# Patient Record
Sex: MALE | Race: WHITE | Employment: OTHER | URBAN - METROPOLITAN AREA
[De-identification: names, ages, dates, MRNs, and addresses within clinical notes are randomized per-mention and may not be internally consistent; named-entity substitution may affect disease eponyms.]

---

## 2020-10-08 PROBLEM — H26.492 LEFT POSTERIOR CAPSULAR OPACIFICATION: Status: ACTIVE | Noted: 2018-03-01

## 2020-10-08 PROBLEM — H43.813 POSTERIOR VITREOUS DETACHMENT OF BOTH EYES: Status: ACTIVE | Noted: 2018-03-01

## 2020-10-08 PROBLEM — R05.9 COUGH: Status: ACTIVE | Noted: 2020-10-08

## 2020-10-08 PROBLEM — J45.20 MILD INTERMITTENT ASTHMA: Status: ACTIVE | Noted: 2017-08-22

## 2020-10-08 PROBLEM — G47.33 OBSTRUCTIVE SLEEP APNEA: Status: ACTIVE | Noted: 2017-05-12

## 2020-10-08 PROBLEM — I71.40 ABDOMINAL AORTIC ANEURYSM (AAA) WITHOUT RUPTURE: Status: ACTIVE | Noted: 2020-09-29

## 2020-10-08 PROBLEM — I67.9 CEREBROVASCULAR DISEASE, UNSPECIFIED: Status: ACTIVE | Noted: 2020-10-08

## 2020-10-08 PROBLEM — S72.009A HIP FRACTURE (HCC): Status: ACTIVE | Noted: 2020-05-19

## 2020-10-08 PROBLEM — Z96.1 PSEUDOPHAKIA: Status: ACTIVE | Noted: 2019-03-05

## 2020-10-08 PROBLEM — Z95.3 S/P AORTIC VALVE REPLACEMENT WITH BIOPROSTHETIC VALVE: Status: ACTIVE | Noted: 2018-05-29

## 2020-10-08 PROBLEM — N20.0 KIDNEY STONE: Status: ACTIVE | Noted: 2020-10-08

## 2020-10-08 PROBLEM — I48.20 CHRONIC ATRIAL FIBRILLATION (HCC): Status: ACTIVE | Noted: 2017-05-12

## 2020-10-08 PROBLEM — I50.32 CHRONIC HEART FAILURE WITH PRESERVED EJECTION FRACTION (HCC): Status: ACTIVE | Noted: 2020-03-04

## 2020-10-08 PROBLEM — S80.12XA HEMATOMA OF LEFT LOWER EXTREMITY: Status: ACTIVE | Noted: 2019-05-20

## 2020-10-08 PROBLEM — I10 ESSENTIAL HYPERTENSION: Status: ACTIVE | Noted: 2020-10-08

## 2020-10-08 PROBLEM — Z85.828 HISTORY OF BASAL CELL CARCINOMA OF SKIN: Status: ACTIVE | Noted: 2017-10-22

## 2020-10-08 PROBLEM — I27.20 MILD PULMONARY HYPERTENSION (HCC): Status: ACTIVE | Noted: 2018-12-04

## 2020-10-08 PROBLEM — G45.4 TGA (TRANSIENT GLOBAL AMNESIA): Status: ACTIVE | Noted: 2020-05-27

## 2022-03-18 PROBLEM — S72.009A HIP FRACTURE (HCC): Status: ACTIVE | Noted: 2020-05-19

## 2022-03-18 PROBLEM — I10 ESSENTIAL HYPERTENSION: Status: ACTIVE | Noted: 2020-10-08

## 2022-03-18 PROBLEM — G47.33 OBSTRUCTIVE SLEEP APNEA: Status: ACTIVE | Noted: 2017-05-12

## 2022-03-19 PROBLEM — N20.0 KIDNEY STONE: Status: ACTIVE | Noted: 2020-10-08

## 2022-03-19 PROBLEM — Z95.3 S/P AORTIC VALVE REPLACEMENT WITH BIOPROSTHETIC VALVE: Status: ACTIVE | Noted: 2018-05-29

## 2022-03-19 PROBLEM — Z85.828 HISTORY OF BASAL CELL CARCINOMA OF SKIN: Status: ACTIVE | Noted: 2017-10-22

## 2022-03-19 PROBLEM — G45.4 TGA (TRANSIENT GLOBAL AMNESIA): Status: ACTIVE | Noted: 2020-05-27

## 2022-03-19 PROBLEM — S80.12XA HEMATOMA OF LEFT LOWER EXTREMITY: Status: ACTIVE | Noted: 2019-05-20

## 2022-03-19 PROBLEM — Z96.1 PSEUDOPHAKIA: Status: ACTIVE | Noted: 2019-03-05

## 2022-03-19 PROBLEM — I71.40 ABDOMINAL AORTIC ANEURYSM (AAA) WITHOUT RUPTURE (HCC): Status: ACTIVE | Noted: 2020-09-29

## 2022-03-19 PROBLEM — H26.492 LEFT POSTERIOR CAPSULAR OPACIFICATION: Status: ACTIVE | Noted: 2018-03-01

## 2022-03-19 PROBLEM — R05.9 COUGH: Status: ACTIVE | Noted: 2020-10-08

## 2022-03-19 PROBLEM — I50.32 CHRONIC HEART FAILURE WITH PRESERVED EJECTION FRACTION (HCC): Status: ACTIVE | Noted: 2020-03-04

## 2022-03-19 PROBLEM — J45.20 MILD INTERMITTENT ASTHMA: Status: ACTIVE | Noted: 2017-08-22

## 2022-03-20 PROBLEM — I27.20 MILD PULMONARY HYPERTENSION (HCC): Status: ACTIVE | Noted: 2018-12-04

## 2022-03-20 PROBLEM — I48.20 CHRONIC ATRIAL FIBRILLATION (HCC): Status: ACTIVE | Noted: 2017-05-12

## 2022-03-20 PROBLEM — I67.9 CEREBROVASCULAR DISEASE, UNSPECIFIED: Status: ACTIVE | Noted: 2020-10-08

## 2022-03-20 PROBLEM — H43.813 POSTERIOR VITREOUS DETACHMENT OF BOTH EYES: Status: ACTIVE | Noted: 2018-03-01

## 2022-10-24 ENCOUNTER — OFFICE VISIT (OUTPATIENT)
Dept: ORTHOPEDIC SURGERY | Age: 77
End: 2022-10-24
Payer: MEDICARE

## 2022-10-24 VITALS — BODY MASS INDEX: 34.54 KG/M2 | WEIGHT: 255 LBS | HEIGHT: 72 IN

## 2022-10-24 DIAGNOSIS — M16.12 PRIMARY OSTEOARTHRITIS OF LEFT HIP: Primary | ICD-10-CM

## 2022-10-24 DIAGNOSIS — Z96.641 STATUS POST RIGHT HIP REPLACEMENT: ICD-10-CM

## 2022-10-24 PROCEDURE — 1101F PT FALLS ASSESS-DOCD LE1/YR: CPT | Performed by: ORTHOPAEDIC SURGERY

## 2022-10-24 PROCEDURE — G8756 NO BP MEASURE DOC: HCPCS | Performed by: ORTHOPAEDIC SURGERY

## 2022-10-24 PROCEDURE — 99204 OFFICE O/P NEW MOD 45 MIN: CPT | Performed by: ORTHOPAEDIC SURGERY

## 2022-10-24 PROCEDURE — G8536 NO DOC ELDER MAL SCRN: HCPCS | Performed by: ORTHOPAEDIC SURGERY

## 2022-10-24 PROCEDURE — 1123F ACP DISCUSS/DSCN MKR DOCD: CPT | Performed by: ORTHOPAEDIC SURGERY

## 2022-10-24 PROCEDURE — G8427 DOCREV CUR MEDS BY ELIG CLIN: HCPCS | Performed by: ORTHOPAEDIC SURGERY

## 2022-10-24 PROCEDURE — G8417 CALC BMI ABV UP PARAM F/U: HCPCS | Performed by: ORTHOPAEDIC SURGERY

## 2022-10-24 PROCEDURE — G8432 DEP SCR NOT DOC, RNG: HCPCS | Performed by: ORTHOPAEDIC SURGERY

## 2022-10-24 RX ORDER — ESCITALOPRAM OXALATE 5 MG/1
5 TABLET ORAL DAILY
COMMUNITY
Start: 2022-09-19

## 2022-10-24 RX ORDER — TORSEMIDE 100 MG/1
100 TABLET ORAL DAILY
COMMUNITY
Start: 2022-10-06

## 2022-10-24 RX ORDER — CYANOCOBALAMIN 1000 UG/ML
1000 INJECTION, SOLUTION INTRAMUSCULAR; SUBCUTANEOUS
COMMUNITY
Start: 2022-06-15

## 2022-10-24 RX ORDER — DONEPEZIL HYDROCHLORIDE 5 MG/1
5 TABLET, FILM COATED ORAL DAILY
COMMUNITY
Start: 2022-06-15

## 2022-10-24 NOTE — PROGRESS NOTES
Jannette Foss (: 1945) is a 65 y.o. male, patient, here for evaluation of the following chief complaint(s):  Hip Pain (Left /)       SUBJECTIVE/OBJECTIVE:  Jannette Foss presents today complaining of left hip pain. Progressive symptoms over the last 6 to 12 months. Pain is diffuse, worst anterior and in the groin. Walking on flat ground for long distances is extremely limited. Significant pain at start up. Difficulty doing socks and shoes, navigating stairs, getting in and out of a car. He takes Tylenol for pain which does not help. He uses a cane most of the time. He is very unhappy with progressive pain and dysfunction, declining quality of life. History of right total hip arthroplasty twice, once in  and again in , treated in [place]. Denies subjective leg length discrepancy. On Eliquis for A. fib. He also sees a cardiologist for history of repaired aortic aneurysm. Has a neurologist for early dementia type symptoms that seemed to be exacerbated after his most recent hip surgery. Symptoms after that surgery are what led to the new diagnosis. PHYSICAL EXAM:  Vitals: Ht 6' (1.829 m)   Wt 255 lb (115.7 kg)   BMI 34.58 kg/m²   Body mass index is 34.58 kg/m². 65y.o. year old M in no acute distress. Ambulates with mild Trendelenburg gait on the left side. Lumbar spine is stiff, no pain with flexion extension. No nerve tension signs. Pain-free motion right total hip. Positive Stinchfield on the left. Mild hip flexion contracture, cannot extend to neutral while lying supine without significant pain. Flexion limited to 90, internal rotation -5, external rotation only about 30 with pain. Symmetrical palpable distal pulses. No gross motor or sensory deficits in lower extremities. No distal edema. Apparent leg length discrepancy with the left approximately 1.5 cm short.     IMAGING:  Radiographs: 3 x-ray views of the right hip performed at an outside facility recently were reviewed and interpreted by me today. Severe osteoarthritis of the left hip with complete loss of proximal joint space. Underlying cam deformity. Peripheral osteophyte formation present. Cementless total hip components in the right hip, apparently well fixed. Cerclage cable present around the proximal femur. XR Results (most recent):  Results from Appointment encounter on 10/24/22    XR PELV 1 OR 2 V    Narrative  Single weightbearing AP pelvis x-ray today for preoperative planning purposes demonstrates complete loss of proximal hip joint space on the left hip. Underlying cam deformity. Peripheral osteophyte formation on both sides of the joint. Severe lumbar degenerative disc disease present. Cementless right total hip components in satisfactory position and alignment, no evidence of loosening. ASSESSMENT/PLAN:  1. Primary osteoarthritis of left hip  2. Status post right hip replacement    We discussed continued conservative treatment measures vs left total hip replacement. Symptoms have progressed despite conservative treatment measures outlined above and he desires to proceed with left total hip replacement. Discussed risks, benefits, and alternatives in detail, as well as anticipated hospital stay and course of rehabilitation. The patient and his wife both understand high risk for acute delirium and overall worsening of cognitive dysfunction after surgery. We will do everything we can to perform surgery under spinal anesthesia. He will see primary care physician prior to surgery, as well as his cardiologist.   All questions answered. Plan is to use topical TXA and half dose eliquis x5 days postop before resuming full preop dose. In order to utilize spinal anesthesia, he will need to be off of Eliquis for 72 hours prior to surgery. Plan to use direct anterior approach. No follow-ups on file.           Review Of Systems  ROS    Positive for: Musculoskeletal  Last edited by Brenda Bales on 10/24/2022  1:42 PM.         Patient denies any recent fever, chills, nausea, vomiting, chest pain, or shortness of breath. No Known Allergies    Current Outpatient Medications   Medication Sig    torsemide (DEMADEX) 100 mg tablet Take 100 mg by mouth daily. cyanocobalamin (VITAMIN B12) 1,000 mcg/mL injection 1,000 mcg by IntraMUSCular route. donepeziL (ARICEPT) 5 mg tablet Take 5 mg by mouth daily. escitalopram oxalate (LEXAPRO) 5 mg tablet Take 5 mg by mouth daily. apixaban (ELIQUIS) 5 mg tablet Take 5 mg by mouth two (2) times a day. cetirizine (ZYRTEC) 10 mg tablet Take 10 mg by mouth daily. cholecalciferol (VITAMIN D3) (1000 Units /25 mcg) tablet Take 1,000 Units by mouth daily. hydrocortisone (HYTONE) 2.5 % ointment     metoprolol succinate (TOPROL-XL) 50 mg XL tablet Take 50 mg by mouth daily. simvastatin (ZOCOR) 40 mg tablet Take 40 mg by mouth nightly. acetaminophen (TYLENOL) 500 mg tablet Take 500 mg by mouth every four (4) hours as needed. (Patient not taking: Reported on 10/24/2022)    furosemide (LASIX) 40 mg tablet Take 40 mg by mouth daily. No current facility-administered medications for this visit.        Past Medical History:   Diagnosis Date    Abdominal aortic aneurysm (AAA) without rupture (HCC)     Arrhythmia     Arthropathy     Basal cell carcinoma     lower back    Cerebrovascular disease     Chronic heart failure with preserved ejection fraction (HCC)     Dyslipidemia     Erectile dysfunction     Essential hypertension     H/O maze procedure     Hypertension     Mild intermittent asthma     Osteoarthritis of right hip     S/P AVR (aortic valve replacement) and aortoplasty         Past Surgical History:   Procedure Laterality Date    HX COLONOSCOPY      HX HIP REPLACEMENT Right     4/2014 and 5/20/20    HX TONSIL AND ADENOIDECTOMY         Family History   Problem Relation Age of Onset    Kidney Disease Father Diabetes Sister     Diabetes Brother     Heart Failure Other         Social History     Socioeconomic History    Marital status:      Spouse name: Not on file    Number of children: Not on file    Years of education: Not on file    Highest education level: Not on file   Occupational History    Not on file   Tobacco Use    Smoking status: Never    Smokeless tobacco: Never   Vaping Use    Vaping Use: Never used   Substance and Sexual Activity    Alcohol use: Not on file    Drug use: Never    Sexual activity: Not on file   Other Topics Concern    Not on file   Social History Narrative    Not on file     Social Determinants of Health     Financial Resource Strain: Not on file   Food Insecurity: Not on file   Transportation Needs: Not on file   Physical Activity: Not on file   Stress: Not on file   Social Connections: Not on file   Intimate Partner Violence: Not on file   Housing Stability: Not on file       No orders of the defined types were placed in this encounter. Raf Cardona. Zari Ansari M.D. An electronic signature was used to authenticate this note.

## 2022-10-25 DIAGNOSIS — M16.12 PRIMARY OSTEOARTHRITIS OF LEFT HIP: Primary | ICD-10-CM

## 2022-12-27 ENCOUNTER — HOSPITAL ENCOUNTER (OUTPATIENT)
Dept: PREADMISSION TESTING | Age: 77
Discharge: HOME | End: 2022-12-27
Payer: MEDICARE

## 2022-12-27 VITALS
HEIGHT: 71 IN | HEART RATE: 60 BPM | BODY MASS INDEX: 35.34 KG/M2 | DIASTOLIC BLOOD PRESSURE: 75 MMHG | WEIGHT: 252.43 LBS | TEMPERATURE: 97.4 F | SYSTOLIC BLOOD PRESSURE: 135 MMHG

## 2022-12-27 LAB
ABO + RH BLD: NORMAL
APPEARANCE UR: CLEAR
BACTERIA URNS QL MICRO: NEGATIVE /HPF
BILIRUB UR QL: NEGATIVE
BLOOD GROUP ANTIBODIES SERPL: NORMAL
COLOR UR: ABNORMAL
EPITH CASTS URNS QL MICRO: ABNORMAL /LPF
EST. AVERAGE GLUCOSE BLD GHB EST-MCNC: 114 MG/DL
GLUCOSE UR STRIP.AUTO-MCNC: NEGATIVE MG/DL
HBA1C MFR BLD: 5.6 % (ref 4–5.6)
HGB UR QL STRIP: NEGATIVE
HYALINE CASTS URNS QL MICRO: ABNORMAL /LPF (ref 0–5)
INR PPP: 1.1 (ref 0.9–1.1)
KETONES UR QL STRIP.AUTO: NEGATIVE MG/DL
LEUKOCYTE ESTERASE UR QL STRIP.AUTO: NEGATIVE
NITRITE UR QL STRIP.AUTO: NEGATIVE
PH UR STRIP: 6.5 [PH] (ref 5–8)
PROT UR STRIP-MCNC: 100 MG/DL
PROTHROMBIN TIME: 11.1 SEC (ref 9–11.1)
RBC #/AREA URNS HPF: ABNORMAL /HPF (ref 0–5)
SP GR UR REFRACTOMETRY: 1.02 (ref 1–1.03)
SPECIMEN EXP DATE BLD: NORMAL
UA: UC IF INDICATED,UAUC: ABNORMAL
UROBILINOGEN UR QL STRIP.AUTO: 0.2 EU/DL (ref 0.2–1)
WBC URNS QL MICRO: ABNORMAL /HPF (ref 0–4)

## 2022-12-27 PROCEDURE — 36415 COLL VENOUS BLD VENIPUNCTURE: CPT

## 2022-12-27 PROCEDURE — 81001 URINALYSIS AUTO W/SCOPE: CPT

## 2022-12-27 PROCEDURE — 86900 BLOOD TYPING SEROLOGIC ABO: CPT

## 2022-12-27 PROCEDURE — 85610 PROTHROMBIN TIME: CPT

## 2022-12-27 PROCEDURE — 83036 HEMOGLOBIN GLYCOSYLATED A1C: CPT

## 2022-12-27 NOTE — PERIOP NOTES
[address]  ORTHOPAEDIC    Surgery Date:   1/3/23    Your surgeon's office or [facility] staff will call you between 4 PM- 8 PM the day before surgery with your arrival time. If your surgery is on a Monday, you will receive a call the preceding Friday. Please report to [facility] Patient Access/Admitting on the 1st floor. Bring your insurance card, photo identification, and any copayment (if applicable). If you are going home the same day of your surgery, you must have a responsible adult to drive you home. You need to have a responsible adult to stay with you the first 24 hours after surgery and you should not drive a car for 24 hours following your surgery. Do NOT eat any solid foods after midnight the night before surgery including candy, mints or gum. You may drink clear liquids from midnight until 1 hour prior to arrival time. You may drink up to 12 ounces at one time every 4 hours. Do NOT drink alcohol or smoke 24 hours before surgery. STOP smoking for 14 days prior as it helps with breathing and healing after surgery. If your arrival time is 3pm or later, you may eat a light breakfast before 8am (toast, bagel-no butter, black coffee, plain tea, fruit juice-no pulp) Please note special instructions, if applicable, below for medications. If you are being admitted to the hospital,please leave personal belongings/luggage in your car until you have an assigned hospital room number. Please wear comfortable clothes. Wear your glasses instead of contacts. We ask that all money, jewelry and valuables be left at home. Wear no make up, particularly mascara, the day of surgery. All body piercings, rings, and jewelry need to be removed and left at home. Please remove any nail polish or artificial nails from your fingernails. Please wear your hair loose or down. Please no pony-tails, buns, or any metal hair accessories.  If you shower the morning of surgery, please do not apply any lotions or powders afterwards. You may wear deodorant. Do not shave any body area within 24 hours of your surgery. Please follow all instructions to avoid any potential surgical cancellation. Should your physical condition change, (i.e. fever, cold, flu, etc.) please notify your surgeon as soon as possible. It is important to be on time. If a situation occurs where you may be delayed, please call:  [phone] / [phone] on the day of surgery. The Preadmission Testing staff can be reached at [phone]. Special instructions: [address] [place] [address] DAY OF SURGERY. Current Outpatient Medications   Medication Sig    torsemide (DEMADEX) 100 mg tablet Take 100 mg by mouth daily. cyanocobalamin (VITAMIN B12) 1,000 mcg/mL injection 1,000 mcg by IntraMUSCular route every month. HAS ALREADY HAD IN DEC. 2022    donepeziL (ARICEPT) 5 mg tablet Take 5 mg by mouth nightly. escitalopram oxalate (LEXAPRO) 5 mg tablet Take 5 mg by mouth daily. apixaban (ELIQUIS) 5 mg tablet Take 5 mg by mouth two (2) times a day. cetirizine (ZYRTEC) 10 mg tablet Take 10 mg by mouth nightly. cholecalciferol (VITAMIN D3) (1000 Units /25 mcg) tablet Take 2,000 Units by mouth daily. metoprolol succinate (TOPROL-XL) 50 mg XL tablet Take 50 mg by mouth daily. simvastatin (ZOCOR) 40 mg tablet Take 40 mg by mouth nightly. No current facility-administered medications for this encounter. YOU MUST ONLY TAKE THESE MEDICATIONS THE MORNING OF SURGERY WITH A SIP OF WATER: METOPROLOL, LEXAPRO, TORSEMIDE  MEDICATIONS TO TAKE THE MORNING OF SURGERY ONLY IF NEEDED: NONE  HOLD these prescription medications BEFORE Surgery: NONE  Ask your surgeon/prescribing physician about when/if to STOP taking these medications: ELIQUIS  Stop any non-steroidal anti-inflammatory drugs (i.e. Ibuprofen, Naproxen, Advil, Aleve) 3 days before surgery. You may take Tylenol.   STOP all vitamins and herbal supplements 1 week prior to  surgery. If you are currently taking Plavix, Coumadin, or any other blood-thinning/anticoagulant medication contact your prescribing physician for instructions. Preventing Infections Before and After - Your Surgery    IMPORTANT INSTRUCTIONS    You play an important role in your health and preparation for surgery. To reduce the germs on your skin you will need to shower with CHG soap (Chorhexidine gluconate 4%) two times before surgery. CHG soap (Hibiclens, Hex-A-Clens or store brand)  CHG soap will be provided at your Preadmission Testing (PAT) appointment. If you do not have a PAT appointment before surgery, you may arrange to  CHG soap from our office or purchase CHG soap at a pharmacy, grocery or department store. You need to purchase TWO 4 ounce bottles to use for your 2 showers. Steps to follow:  Bryn Bowels your hair with your normal shampoo and your body with regular soap and rinse well to remove shampoo and soap from your skin. Wet a clean washcloth and turn off the shower. Put CHG soap on washcloth and apply to your entire body from the neck down. Do not use on your head, face or private parts(genitals). Do not use CHG soap on open sores, wounds or areas of skin irritation. Wash you body gently for 5 minutes. Do not wash your skin too hard. This soap does not create lather. Pay special attention to your underarms and from your belly button to your feet. Turn the shower back on and rinse well to get CHG soap off your body. Pat your skin dry with a clean, dry towel. Do not apply lotions or moisturizer. Put on clean clothes and sleep on fresh bed sheets and do not allow pets to sleep with you.     Shower with CHG soap 2 times before your surgery  The evening before your surgery  The morning of your surgery      Tips to help prevent infections after your surgery:  Protect your surgical wound from germs:  Hand washing is the most important thing you and your caregivers can do to prevent infections. Keep your bandage clean and dry! Do not touch your surgical wound. Use clean, freshly washed towels and washcloths every time you shower; do not share bath linens with others. Until your surgical wound is healed, wear clothing and sleep on bed linens each day that are clean and freshly washed. Do not allow pets to sleep in your bed with you or touch your surgical wound. Do not smoke - smoking delays wound healing. This may be a good time to stop smoking. If you have diabetes, it is important for you to manage your blood sugar levels properly before your surgery as well as after your surgery. Poorly managed blood sugar levels slow down wound healing and prevent you from healing completely. Prevention of Infection  Testing for Staphylococcus aureus on your skin before surgery    Staphylococcus aureus (staph) is a common bacteria that is found on the body. It normally does not cause infection on healthy skin. Before surgery, you will be tested to see if you have staph by swabbing the inside of your nose. When you have an incision with surgery, the goal is to protect that incision from infection. Removal of the staph bacteria before surgery can decrease the risk of a surgical site infection. If your nose swab is positive for staph you will be called. Your treatment will include 2 steps:  Prescription for Mupirocin ointment to be used in each nostril twice a day for 5 days. Showering with Chlorhexidine (CHG) liquid soap for 5 days prior to surgery. How to use Mupirocin ointment in your nose   the prescription from your pharmacy. You will receive a large tube of ointment which will be big enough for all of your treatments. You will apply this ointment to each nostril 2 times a day for 5 days. Wash your hands with  gel or soap and water for 20 seconds before using ointment. Place a pea-sized amount of ointment on a cotton Q-tip.   Apply ointment just inside of each nostril with the Q-tip. Do not push Q-tip or ointment deep inside you nose. Press your nostrils together and massage for a few seconds. Wash your hands with  gel or soap and water after you are finished. Do not get ointment near your eyes. If it gets into your eyes, rinse them with cool water. If you need to use nasal spray, clean the tip of the bottle with alcohol before use and do not use both at the same time. If you are scheduled for COVID testing during the 5 days, do NOT apply morning dose until after the COVID test has been performed. How to use Chlorhexidine (CHG) 4% liquid soap  Purchase an 8 ounce bottle of CHG liquid soap (Chlorhexidine 4%, Hibiclens, Hex-A-Clens or store brand) at a pharmacy or grocery store. Wash your hair with your normal shampoo and your body with regular soap and rinse well to remove shampoo and soap from your skin. Wet a clean washcloth and turn off the shower. Put CHG soap on washcloth and apply to your entire body from the neck down. Do not use on your head, face or private parts(genitals). Do not use CHG soap on open sores, wounds or areas of skin irritation. Wash your body gently for 5 minutes. Do not wash your skin too hard. This soap does not create lather. Pay special attention to your underarms and from your belly button to your feet. Turn the shower back on and rinse well to get CHG soap off your body. Pat your skin dry with a clean, dry towel. Do not apply lotions or moisturizer. Put on clean clothes and sleep on fresh bed sheets the night before surgery. Do not allow pets to sleep with you. Eating and Drinking Before Surgery    You may eat a regular dinner at the usual time on the day before your surgery. Do NOT eat any solid foods after midnight unless your arrival time at the hospital is 3pm or later.   You may drink clear liquids only from 12 midnight until 1 hours prior to your arrival time at the hospital on the day of your surgery. Do NOT drink alcohol. Clear liquids include:  Water  Fruit juices without pulp( i.e. apple juice)  Carbonated beverages  Black coffee (no cream/milk)  Tea (no cream/milk)  Gatorade  You may drink up to 12-16 ounces at one time every 4 hours between the hours of midnight and 1 hour before your arrival time at the hospital. Example- if your arrival time at the hospital is 6am, you may drink 12-16 ounces of clear liquids no later than 5am.  If your arrival time at the hospital is 3pm or later, you may eat a light breakfast before 8am.  A light breakfast includes: Toast or bagel (no butter)  Black coffee (no cream/milk)  Tea (no cream/milk)  Fruit juices without pulp ( i.e. apple juice)  Do NOT eat meat, eggs, vegetables or fruit  If you have any questions, please contact your surgeon's office. Patient Information Regarding COVID Restrictions    Day of Procedure    Please park in the parking deck or any designated visitor parking lot. Enter the facility through the Main Entrance of the hospital.  On the day of surgery, please provide the cell phone number of the person who will be waiting for you to the Patient Access representative at the time of registration. Masks are highly recommended in the hospital, but not required. Once your procedure and the immediate recovery period is completed, a nurse in the recovery area will contact your designated visitor to inform them of your room number or to otherwise review other pertinent information regarding your care. Social distancing practices are strongly encouraged in waiting areas and the cafeteria. The patient  AND WIFE was contacted in person. They verbalized understanding of all instructions does not  need reinforcement.

## 2022-12-27 NOTE — PERIOP NOTES
COPY OF COVID CARD PLACED ON CHART. CALL TO CARDIOLOGY/DR. TAMMI HURST/[facility]/[place], [place]./[phone] AND REQ MOST RECENT NOTES TO BE FAXED. PT'S WIFE BROUGHT IN EKG DONE AT PCP OFFICE ON 12/7/22 THAT WAS READ BY PT'S CARDIOLOGIST. THIS WAS PLACED ON CHART AFTER MAKING PT A COPY. CBC AND BMP FROM PCP DONE 12/7/22 PLACED ON CHART. A1C, PT, [facility], T&S AND MRSA DONE AT [facility] APPT. PT ON ELIQUIS 5MG PO BID. CARDIOLOGY INSTRUCTED PT TO STOP ELIQUIS ON 12/30/22 (3 DAYS PRIOR TO SURGERY).

## 2022-12-28 LAB
BACTERIA SPEC CULT: NORMAL
BACTERIA SPEC CULT: NORMAL
SERVICE CMNT-IMP: NORMAL

## 2022-12-28 NOTE — PERIOP NOTES
PAT Nurse Practitioner   Pre-Operative Chart Review/Assessment:-ORTHOPEDIC                Patient Name:  Ramona Lund                                                           Age:   65 y.o.    :  1945     Today's Date:  2022     Date of PAT:   22      Date of Surgery:    1/3/23      Procedure(s):  Left Total Hip Arthroplasty     Surgeon:   Dr. Emily Soto:      1)  Medical Clearance/PCP:  Girish Castellanos MD      2)  Cardiac Clearance:  Pt followed by Dr. Samson Kumar NP([facility]). LOV 22. Clearance obtained. Notes and testing in CE. 3)  Diabetic Treatment Consult:  Not indicated. A1c-5.6      4)  Sleep Apnea evaluation:   +JULES dx. Pt uses CPAP.        5) Treatment for MRSA/Staph Aureus:  Neg      6) Additional Concerns:  HTN, GERD, A-fib s/p MAZE, HFpEF(60%), AS s/p AVR, hx of CVA, [place]              Vital Signs:         Vitals:    22 1121   BP: 135/75   Pulse: 60   Temp: 97.4 °F (36.3 °C)   Weight: 114.5 kg (252 lb 6.8 oz)   Height: 5' 11\" (1.803 m)          Body mass index is 35.21 kg/m².         ____________________________________________  PAST MEDICAL HISTORY  Past Medical History:   Diagnosis Date    Abdominal aortic aneurysm (AAA) without rupture     Adverse effect of anesthesia     confusion and memory loss after anesthesia    Arrhythmia     AFIB    Arthropathy     Basal cell carcinoma     LOW BACK, HEAD, NECK    Cerebrovascular disease     Chronic heart failure with preserved ejection fraction (HCC)     Dyslipidemia     Erectile dysfunction     Essential hypertension     GERD (gastroesophageal reflux disease)     H/O maze procedure     Heart failure ([address]     CHF    Hypertension     Mild intermittent asthma     Osteoarthritis of right hip     Psychiatric disorder     DEPRESSION    S/P AVR (aortic valve replacement) and aortoplasty     Sleep apnea     CPAP USE    Stroke ([address]     DURING SURGERY FOR AORTIC VALVE ____________________________________________  PAST SURGICAL HISTORY  Past Surgical History:   Procedure Laterality Date    HX COLONOSCOPY      HX HEART VALVE SURGERY  2011    AORTIC VALVE    HX HEENT Bilateral     CATARACTS    HX HIP REPLACEMENT Right     4/2014 and 5/20/20    HX OTHER SURGICAL  2022    Alzheimer's    HX TONSIL AND ADENOIDECTOMY      OK CARDIAC SURG PROCEDURE UNLIST  2011    VASCULAR SURGERY PROCEDURE UNLIST  2021    peripheral left leg      ____________________________________________  HOME MEDICATIONS  Current Outpatient Medications   Medication Sig    torsemide (DEMADEX) 100 mg tablet Take 100 mg by mouth daily. cyanocobalamin (VITAMIN B12) 1,000 mcg/mL injection 1,000 mcg by IntraMUSCular route every month. HAS ALREADY HAD IN DEC. 2022    donepeziL (ARICEPT) 5 mg tablet Take 5 mg by mouth nightly. escitalopram oxalate (LEXAPRO) 5 mg tablet Take 5 mg by mouth daily. apixaban (ELIQUIS) 5 mg tablet Take 5 mg by mouth two (2) times a day. cetirizine (ZYRTEC) 10 mg tablet Take 10 mg by mouth nightly. cholecalciferol (VITAMIN D3) (1000 Units /25 mcg) tablet Take 2,000 Units by mouth daily. metoprolol succinate (TOPROL-XL) 50 mg XL tablet Take 50 mg by mouth daily. simvastatin (ZOCOR) 40 mg tablet Take 40 mg by mouth nightly.      No current facility-administered medications for this encounter.      ____________________________________________  ALLERGIES  No Known Allergies   ____________________________________________  SOCIAL HISTORY  Social History     Tobacco Use    Smoking status: Never    Smokeless tobacco: Never   Substance Use Topics    Alcohol use: Never      ____________________________________________   Internal Administration   First Dose COVID-19, MODERNA BLUE border, Primary or Immunocompromised, (age 18y+), IM, 100 mcg/0.5mL  01/30/2021   Second Dose COVID-19, MODERNA BLUE border, Primary or Immunocompromised, (age 18y+), IM, 100 mcg/0.5mL  02/27/2021      Last COVID Lab SARS-CoV-2, HERSON (no units)   Date Value   05/20/2020 not detected                    Labs:     Hospital Outpatient Visit on 12/27/2022   Component Date Value Ref Range Status    Crossmatch Expiration 12/27/2022 01/06/2023,2359   Final    ABO/Rh(D) 12/27/2022 A POSITIVE   Final    Antibody screen 12/27/2022 NEG   Final    INR 12/27/2022 1.1  0.9 - 1.1   Final    A single therapeutic range for Vit K antagonists may not be optimal for all indications - see June, 2008 issue of Chest, American College of Chest Physicians Evidence-Based Clinical Practice Guidelines, 8th Edition. Prothrombin time 12/27/2022 11.1  9.0 - 11.1 sec Final    Color 12/27/2022 YELLOW/STRAW    Final    Color Reference Range: Straw, Yellow or Dark Yellow    Appearance 12/27/2022 CLEAR  CLEAR   Final    Specific gravity 12/27/2022 1.018  1.003 - 1.030   Final    pH (UA) 12/27/2022 6.5  5.0 - 8.0   Final    Protein 12/27/2022 100 (A)  NEG mg/dL Final    Glucose 12/27/2022 Negative  NEG mg/dL Final    Ketone 12/27/2022 Negative  NEG mg/dL Final    Bilirubin 12/27/2022 Negative  NEG   Final    Blood 12/27/2022 Negative  NEG   Final    Urobilinogen 12/27/2022 0.2  0.2 - 1.0 EU/dL Final    Nitrites 12/27/2022 Negative  NEG   Final    Leukocyte Esterase 12/27/2022 Negative  NEG   Final    UA:UC IF INDICATED 12/27/2022 CULTURE NOT INDICATED BY UA RESULT  CNI   Final    WBC 12/27/2022 0-4  0 - 4 /hpf Final    RBC 12/27/2022 0-5  0 - 5 /hpf Final    Epithelial cells 12/27/2022 FEW  FEW /lpf Final    Epithelial cell category consists of squamous cells and /or transitional urothelial cells. Renal tubular cells, if present, are separately identified as such.     Bacteria 12/27/2022 Negative  NEG /hpf Final    Hyaline cast 12/27/2022 0-2  0 - 5 /lpf Final    Hemoglobin A1c 12/27/2022 5.6  4.0 - 5.6 % Final    Comment: NEW METHOD  PLEASE NOTE NEW REFERENCE RANGE  (NOTE)  HbA1C Interpretive Ranges  <5.7              Normal  5.7 - 6.4         Consider Prediabetes  >6.5              Consider Diabetes      Est. average glucose 12/27/2022 114  mg/dL Final    Special Requests: 12/27/2022 NO SPECIAL REQUESTS    Final    Culture result: 12/27/2022 MRSA NOT PRESENT    Final    Culture result: 12/27/2022     Final                    Value:Screening of patient nares for MRSA is for surveillance purposes and, if positive, to facilitate isolation considerations in high risk settings. It is not intended for automatic decolonization interventions per se as regimens are not sufficiently effective to warrant routine use. Skin:     Denies open wounds, cuts, sores, rashes or other areas of concern in PAT assessment.           Sarkis Marquez NP  Available via [facility]

## 2022-12-30 ENCOUNTER — ANESTHESIA EVENT (OUTPATIENT)
Dept: SURGERY | Age: 77
DRG: 981 | End: 2022-12-30
Payer: MEDICARE

## 2023-01-03 ENCOUNTER — ANESTHESIA (OUTPATIENT)
Dept: SURGERY | Age: 78
DRG: 981 | End: 2023-01-03
Payer: MEDICARE

## 2023-01-03 ENCOUNTER — HOSPITAL ENCOUNTER (INPATIENT)
Age: 78
LOS: 1 days | Discharge: HOME HEALTH | DRG: 469 | End: 2023-01-06
Attending: ORTHOPAEDIC SURGERY | Admitting: ORTHOPAEDIC SURGERY
Payer: MEDICARE

## 2023-01-03 ENCOUNTER — APPOINTMENT (OUTPATIENT)
Dept: GENERAL RADIOLOGY | Age: 78
DRG: 469 | End: 2023-01-03
Attending: ORTHOPAEDIC SURGERY
Payer: MEDICARE

## 2023-01-03 DIAGNOSIS — M16.12 PRIMARY OSTEOARTHRITIS OF LEFT HIP: Primary | ICD-10-CM

## 2023-01-03 LAB
GLUCOSE BLD STRIP.AUTO-MCNC: 88 MG/DL (ref 65–117)
SERVICE CMNT-IMP: NORMAL

## 2023-01-03 PROCEDURE — 74011000250 HC RX REV CODE- 250: Performed by: PHYSICIAN ASSISTANT

## 2023-01-03 PROCEDURE — 74011000250 HC RX REV CODE- 250: Performed by: STUDENT IN AN ORGANIZED HEALTH CARE EDUCATION/TRAINING PROGRAM

## 2023-01-03 PROCEDURE — 76060000039 HC ANESTHESIA 4 TO 4.5 HR: Performed by: ORTHOPAEDIC SURGERY

## 2023-01-03 PROCEDURE — 2709999900 HC NON-CHARGEABLE SUPPLY: Performed by: ORTHOPAEDIC SURGERY

## 2023-01-03 PROCEDURE — 0SRB04Z REPLACEMENT OF LEFT HIP JOINT WITH CERAMIC ON POLYETHYLENE SYNTHETIC SUBSTITUTE, OPEN APPROACH: ICD-10-PCS | Performed by: ORTHOPAEDIC SURGERY

## 2023-01-03 PROCEDURE — 97116 GAIT TRAINING THERAPY: CPT

## 2023-01-03 PROCEDURE — 76010000175 HC OR TIME 4 TO 4.5 HR INTENSV-TIER 1: Performed by: ORTHOPAEDIC SURGERY

## 2023-01-03 PROCEDURE — 74011250637 HC RX REV CODE- 250/637: Performed by: PHYSICIAN ASSISTANT

## 2023-01-03 PROCEDURE — 73501 X-RAY EXAM HIP UNI 1 VIEW: CPT

## 2023-01-03 PROCEDURE — G0378 HOSPITAL OBSERVATION PER HR: HCPCS

## 2023-01-03 PROCEDURE — 77030006835 HC BLD SAW SAG STRY -B: Performed by: ORTHOPAEDIC SURGERY

## 2023-01-03 PROCEDURE — 74011250637 HC RX REV CODE- 250/637

## 2023-01-03 PROCEDURE — 51798 US URINE CAPACITY MEASURE: CPT

## 2023-01-03 PROCEDURE — 77030007866 HC KT SPN ANES BBMI -B: Performed by: STUDENT IN AN ORGANIZED HEALTH CARE EDUCATION/TRAINING PROGRAM

## 2023-01-03 PROCEDURE — 77030040361 HC SLV COMPR DVT MDII -B

## 2023-01-03 PROCEDURE — 3E0T3BZ INTRODUCTION OF ANESTHETIC AGENT INTO PERIPHERAL NERVES AND PLEXI, PERCUTANEOUS APPROACH: ICD-10-PCS | Performed by: ORTHOPAEDIC SURGERY

## 2023-01-03 PROCEDURE — 82962 GLUCOSE BLOOD TEST: CPT

## 2023-01-03 PROCEDURE — 27130 TOTAL HIP ARTHROPLASTY: CPT | Performed by: ORTHOPAEDIC SURGERY

## 2023-01-03 PROCEDURE — 74011250636 HC RX REV CODE- 250/636: Performed by: PHYSICIAN ASSISTANT

## 2023-01-03 PROCEDURE — 27130 TOTAL HIP ARTHROPLASTY: CPT | Performed by: PHYSICIAN ASSISTANT

## 2023-01-03 PROCEDURE — 76210000006 HC OR PH I REC 0.5 TO 1 HR: Performed by: ORTHOPAEDIC SURGERY

## 2023-01-03 PROCEDURE — 77030010507 HC ADH SKN DERMBND J&J -B: Performed by: ORTHOPAEDIC SURGERY

## 2023-01-03 PROCEDURE — 74011000250 HC RX REV CODE- 250: Performed by: NURSE ANESTHETIST, CERTIFIED REGISTERED

## 2023-01-03 PROCEDURE — 74011250636 HC RX REV CODE- 250/636: Performed by: ANESTHESIOLOGY

## 2023-01-03 PROCEDURE — 74011250636 HC RX REV CODE- 250/636: Performed by: NURSE ANESTHETIST, CERTIFIED REGISTERED

## 2023-01-03 PROCEDURE — C1776 JOINT DEVICE (IMPLANTABLE): HCPCS | Performed by: ORTHOPAEDIC SURGERY

## 2023-01-03 PROCEDURE — 77030031139 HC SUT VCRL2 J&J -A: Performed by: ORTHOPAEDIC SURGERY

## 2023-01-03 PROCEDURE — 97161 PT EVAL LOW COMPLEX 20 MIN: CPT

## 2023-01-03 PROCEDURE — 77030018723 HC ELCTRD BLD COVD -A: Performed by: ORTHOPAEDIC SURGERY

## 2023-01-03 PROCEDURE — 77030002933 HC SUT MCRYL J&J -A: Performed by: ORTHOPAEDIC SURGERY

## 2023-01-03 PROCEDURE — 77030033067 HC SUT PDO STRATFX SPIR J&J -B: Performed by: ORTHOPAEDIC SURGERY

## 2023-01-03 PROCEDURE — 97530 THERAPEUTIC ACTIVITIES: CPT

## 2023-01-03 PROCEDURE — 77030020788: Performed by: ORTHOPAEDIC SURGERY

## 2023-01-03 DEVICE — EMPOWR ACETABULAR SYSTEM, LINER, NEUTRAL, HXE+, 40H
Type: IMPLANTABLE DEVICE | Site: HIP | Status: FUNCTIONAL
Brand: DJO SURGICAL

## 2023-01-03 DEVICE — TAPERFILL HIP STEM, STANDARD, SIZE 13
Type: IMPLANTABLE DEVICE | Site: HIP | Status: FUNCTIONAL
Brand: DJO SURGICAL

## 2023-01-03 DEVICE — EMPOWR ACET SYSTEM, CUP, HEMISPHERICAL, CLUSTER HOLE, 56MM
Type: IMPLANTABLE DEVICE | Site: HIP | Status: FUNCTIONAL
Brand: DJO SURGICAL

## 2023-01-03 DEVICE — EMPOWR ACETABULAR, BONE SCREW, 40MM
Type: IMPLANTABLE DEVICE | Site: HIP | Status: FUNCTIONAL
Brand: DJO SURGICAL

## 2023-01-03 DEVICE — EMPOWR ACETABULAR, BONE SCREW, 25MM
Type: IMPLANTABLE DEVICE | Site: HIP | Status: FUNCTIONAL
Brand: DJO SURGICAL

## 2023-01-03 DEVICE — IMPL CAPPED HIP H2 TOTAL ADV OTHER HEAD DJO: Type: IMPLANTABLE DEVICE | Status: FUNCTIONAL

## 2023-01-03 DEVICE — HEAD, FEMORAL, CERAMIC, BILOX DELTA, 40MM +4.0
Type: IMPLANTABLE DEVICE | Site: HIP | Status: FUNCTIONAL
Brand: DJO SURGICAL

## 2023-01-03 RX ORDER — SODIUM CHLORIDE 0.9 % (FLUSH) 0.9 %
5-40 SYRINGE (ML) INJECTION EVERY 8 HOURS
Status: DISCONTINUED | OUTPATIENT
Start: 2023-01-03 | End: 2023-01-06 | Stop reason: HOSPADM

## 2023-01-03 RX ORDER — CELECOXIB 200 MG/1
200 CAPSULE ORAL ONCE
Status: COMPLETED | OUTPATIENT
Start: 2023-01-03 | End: 2023-01-03

## 2023-01-03 RX ORDER — OXYCODONE HYDROCHLORIDE 5 MG/1
2.5 TABLET ORAL
Status: DISCONTINUED | OUTPATIENT
Start: 2023-01-03 | End: 2023-01-06 | Stop reason: HOSPADM

## 2023-01-03 RX ORDER — TRANEXAMIC ACID 100 MG/ML
INJECTION, SOLUTION INTRAVENOUS AS NEEDED
Status: DISCONTINUED | OUTPATIENT
Start: 2023-01-03 | End: 2023-01-03 | Stop reason: HOSPADM

## 2023-01-03 RX ORDER — DONEPEZIL HYDROCHLORIDE 5 MG/1
5 TABLET, FILM COATED ORAL
Status: DISCONTINUED | OUTPATIENT
Start: 2023-01-03 | End: 2023-01-06 | Stop reason: HOSPADM

## 2023-01-03 RX ORDER — EPHEDRINE SULFATE/0.9% NACL/PF 50 MG/5 ML
SYRINGE (ML) INTRAVENOUS AS NEEDED
Status: DISCONTINUED | OUTPATIENT
Start: 2023-01-03 | End: 2023-01-03 | Stop reason: HOSPADM

## 2023-01-03 RX ORDER — BUPIVACAINE HYDROCHLORIDE 5 MG/ML
INJECTION, SOLUTION EPIDURAL; INTRACAUDAL
Status: COMPLETED | OUTPATIENT
Start: 2023-01-03 | End: 2023-01-03

## 2023-01-03 RX ORDER — ACETAMINOPHEN 500 MG
1000 TABLET ORAL ONCE
Status: COMPLETED | OUTPATIENT
Start: 2023-01-03 | End: 2023-01-03

## 2023-01-03 RX ORDER — ATORVASTATIN CALCIUM 40 MG/1
20 TABLET, FILM COATED ORAL
Status: DISCONTINUED | OUTPATIENT
Start: 2023-01-03 | End: 2023-01-06 | Stop reason: HOSPADM

## 2023-01-03 RX ORDER — SODIUM CHLORIDE 0.9 % (FLUSH) 0.9 %
5-40 SYRINGE (ML) INJECTION AS NEEDED
Status: DISCONTINUED | OUTPATIENT
Start: 2023-01-03 | End: 2023-01-03 | Stop reason: HOSPADM

## 2023-01-03 RX ORDER — LIDOCAINE HYDROCHLORIDE 10 MG/ML
0.5 INJECTION, SOLUTION EPIDURAL; INFILTRATION; INTRACAUDAL; PERINEURAL AS NEEDED
Status: DISCONTINUED | OUTPATIENT
Start: 2023-01-03 | End: 2023-01-03 | Stop reason: HOSPADM

## 2023-01-03 RX ORDER — DEXTROSE, SODIUM CHLORIDE, SODIUM LACTATE, POTASSIUM CHLORIDE, AND CALCIUM CHLORIDE 5; .6; .31; .03; .02 G/100ML; G/100ML; G/100ML; G/100ML; G/100ML
125 INJECTION, SOLUTION INTRAVENOUS CONTINUOUS
Status: DISCONTINUED | OUTPATIENT
Start: 2023-01-03 | End: 2023-01-03 | Stop reason: HOSPADM

## 2023-01-03 RX ORDER — FACIAL-BODY WIPES
10 EACH TOPICAL DAILY PRN
Status: DISCONTINUED | OUTPATIENT
Start: 2023-01-05 | End: 2023-01-06 | Stop reason: HOSPADM

## 2023-01-03 RX ORDER — MIDAZOLAM HYDROCHLORIDE 1 MG/ML
1 INJECTION, SOLUTION INTRAMUSCULAR; INTRAVENOUS AS NEEDED
Status: DISCONTINUED | OUTPATIENT
Start: 2023-01-03 | End: 2023-01-03 | Stop reason: HOSPADM

## 2023-01-03 RX ORDER — FENTANYL CITRATE 50 UG/ML
INJECTION, SOLUTION INTRAMUSCULAR; INTRAVENOUS AS NEEDED
Status: DISCONTINUED | OUTPATIENT
Start: 2023-01-03 | End: 2023-01-03 | Stop reason: HOSPADM

## 2023-01-03 RX ORDER — SODIUM CHLORIDE, SODIUM LACTATE, POTASSIUM CHLORIDE, CALCIUM CHLORIDE 600; 310; 30; 20 MG/100ML; MG/100ML; MG/100ML; MG/100ML
INJECTION, SOLUTION INTRAVENOUS
Status: DISCONTINUED | OUTPATIENT
Start: 2023-01-03 | End: 2023-01-03 | Stop reason: HOSPADM

## 2023-01-03 RX ORDER — METOPROLOL SUCCINATE 50 MG/1
50 TABLET, EXTENDED RELEASE ORAL DAILY
Status: DISCONTINUED | OUTPATIENT
Start: 2023-01-04 | End: 2023-01-06 | Stop reason: HOSPADM

## 2023-01-03 RX ORDER — HYDROXYZINE HYDROCHLORIDE 10 MG/1
10 TABLET, FILM COATED ORAL
Status: DISCONTINUED | OUTPATIENT
Start: 2023-01-03 | End: 2023-01-06 | Stop reason: HOSPADM

## 2023-01-03 RX ORDER — OXYCODONE HYDROCHLORIDE 5 MG/1
5 TABLET ORAL AS NEEDED
Status: DISCONTINUED | OUTPATIENT
Start: 2023-01-03 | End: 2023-01-03 | Stop reason: HOSPADM

## 2023-01-03 RX ORDER — MIDAZOLAM HYDROCHLORIDE 1 MG/ML
1 INJECTION, SOLUTION INTRAMUSCULAR; INTRAVENOUS
Status: DISCONTINUED | OUTPATIENT
Start: 2023-01-03 | End: 2023-01-03 | Stop reason: HOSPADM

## 2023-01-03 RX ORDER — SODIUM CHLORIDE 9 MG/ML
75 INJECTION, SOLUTION INTRAVENOUS CONTINUOUS
Status: DISCONTINUED | OUTPATIENT
Start: 2023-01-03 | End: 2023-01-04

## 2023-01-03 RX ORDER — ACETAMINOPHEN 500 MG
500 TABLET ORAL
Status: DISCONTINUED | OUTPATIENT
Start: 2023-01-03 | End: 2023-01-06 | Stop reason: HOSPADM

## 2023-01-03 RX ORDER — PREGABALIN 75 MG/1
75 CAPSULE ORAL ONCE
Status: COMPLETED | OUTPATIENT
Start: 2023-01-03 | End: 2023-01-03

## 2023-01-03 RX ORDER — DIPHENHYDRAMINE HYDROCHLORIDE 50 MG/ML
12.5 INJECTION, SOLUTION INTRAMUSCULAR; INTRAVENOUS AS NEEDED
Status: DISCONTINUED | OUTPATIENT
Start: 2023-01-03 | End: 2023-01-03 | Stop reason: HOSPADM

## 2023-01-03 RX ORDER — DOXYCYCLINE HYCLATE 100 MG
100 TABLET ORAL EVERY 12 HOURS
Status: DISCONTINUED | OUTPATIENT
Start: 2023-01-03 | End: 2023-01-06 | Stop reason: HOSPADM

## 2023-01-03 RX ORDER — NALOXONE HYDROCHLORIDE 0.4 MG/ML
0.4 INJECTION, SOLUTION INTRAMUSCULAR; INTRAVENOUS; SUBCUTANEOUS AS NEEDED
Status: DISCONTINUED | OUTPATIENT
Start: 2023-01-03 | End: 2023-01-06 | Stop reason: HOSPADM

## 2023-01-03 RX ORDER — FENTANYL CITRATE 50 UG/ML
50 INJECTION, SOLUTION INTRAMUSCULAR; INTRAVENOUS AS NEEDED
Status: DISCONTINUED | OUTPATIENT
Start: 2023-01-03 | End: 2023-01-03 | Stop reason: HOSPADM

## 2023-01-03 RX ORDER — OXYCODONE HYDROCHLORIDE 5 MG/1
2.5-5 TABLET ORAL
Qty: 42 TABLET | Refills: 0 | Status: SHIPPED | OUTPATIENT
Start: 2023-01-03 | End: 2023-01-10

## 2023-01-03 RX ORDER — PROPOFOL 10 MG/ML
INJECTION, EMULSION INTRAVENOUS
Status: DISCONTINUED | OUTPATIENT
Start: 2023-01-03 | End: 2023-01-03 | Stop reason: HOSPADM

## 2023-01-03 RX ORDER — SODIUM CHLORIDE 0.9 % (FLUSH) 0.9 %
5-40 SYRINGE (ML) INJECTION EVERY 8 HOURS
Status: DISCONTINUED | OUTPATIENT
Start: 2023-01-03 | End: 2023-01-03 | Stop reason: HOSPADM

## 2023-01-03 RX ORDER — ONDANSETRON 2 MG/ML
4 INJECTION INTRAMUSCULAR; INTRAVENOUS
Status: ACTIVE | OUTPATIENT
Start: 2023-01-03 | End: 2023-01-04

## 2023-01-03 RX ORDER — KETOROLAC TROMETHAMINE 30 MG/ML
15 INJECTION, SOLUTION INTRAMUSCULAR; INTRAVENOUS EVERY 6 HOURS
Status: DISCONTINUED | OUTPATIENT
Start: 2023-01-03 | End: 2023-01-04

## 2023-01-03 RX ORDER — AMOXICILLIN 250 MG
1 CAPSULE ORAL 2 TIMES DAILY
Status: DISCONTINUED | OUTPATIENT
Start: 2023-01-03 | End: 2023-01-06 | Stop reason: HOSPADM

## 2023-01-03 RX ORDER — ACETAMINOPHEN 500 MG
500 TABLET ORAL EVERY 4 HOURS
Qty: 100 TABLET | Refills: 0 | Status: SHIPPED | OUTPATIENT
Start: 2023-01-03

## 2023-01-03 RX ORDER — SODIUM CHLORIDE, SODIUM LACTATE, POTASSIUM CHLORIDE, CALCIUM CHLORIDE 600; 310; 30; 20 MG/100ML; MG/100ML; MG/100ML; MG/100ML
125 INJECTION, SOLUTION INTRAVENOUS CONTINUOUS
Status: DISCONTINUED | OUTPATIENT
Start: 2023-01-03 | End: 2023-01-03 | Stop reason: HOSPADM

## 2023-01-03 RX ORDER — GLYCOPYRROLATE 0.2 MG/ML
INJECTION INTRAMUSCULAR; INTRAVENOUS AS NEEDED
Status: DISCONTINUED | OUTPATIENT
Start: 2023-01-03 | End: 2023-01-03 | Stop reason: HOSPADM

## 2023-01-03 RX ORDER — POLYETHYLENE GLYCOL 3350 17 G/17G
17 POWDER, FOR SOLUTION ORAL DAILY
Status: DISCONTINUED | OUTPATIENT
Start: 2023-01-04 | End: 2023-01-06 | Stop reason: HOSPADM

## 2023-01-03 RX ORDER — AMOXICILLIN 250 MG
1 CAPSULE ORAL
Qty: 60 TABLET | Refills: 0 | Status: SHIPPED | OUTPATIENT
Start: 2023-01-03

## 2023-01-03 RX ORDER — ACETAMINOPHEN 325 MG/1
650 TABLET ORAL ONCE
Status: DISCONTINUED | OUTPATIENT
Start: 2023-01-03 | End: 2023-01-03

## 2023-01-03 RX ORDER — ESCITALOPRAM OXALATE 10 MG/1
5 TABLET ORAL DAILY
Status: DISCONTINUED | OUTPATIENT
Start: 2023-01-04 | End: 2023-01-06 | Stop reason: HOSPADM

## 2023-01-03 RX ORDER — OXYCODONE HYDROCHLORIDE 5 MG/1
5 TABLET ORAL
Status: DISCONTINUED | OUTPATIENT
Start: 2023-01-03 | End: 2023-01-06 | Stop reason: HOSPADM

## 2023-01-03 RX ORDER — MORPHINE SULFATE 2 MG/ML
2 INJECTION, SOLUTION INTRAMUSCULAR; INTRAVENOUS
Status: DISCONTINUED | OUTPATIENT
Start: 2023-01-03 | End: 2023-01-03 | Stop reason: HOSPADM

## 2023-01-03 RX ORDER — FENTANYL CITRATE 50 UG/ML
25 INJECTION, SOLUTION INTRAMUSCULAR; INTRAVENOUS
Status: DISCONTINUED | OUTPATIENT
Start: 2023-01-03 | End: 2023-01-03 | Stop reason: HOSPADM

## 2023-01-03 RX ORDER — ONDANSETRON 2 MG/ML
4 INJECTION INTRAMUSCULAR; INTRAVENOUS AS NEEDED
Status: DISCONTINUED | OUTPATIENT
Start: 2023-01-03 | End: 2023-01-03 | Stop reason: HOSPADM

## 2023-01-03 RX ORDER — ROPIVACAINE HYDROCHLORIDE 5 MG/ML
30 INJECTION, SOLUTION EPIDURAL; INFILTRATION; PERINEURAL AS NEEDED
Status: DISCONTINUED | OUTPATIENT
Start: 2023-01-03 | End: 2023-01-03 | Stop reason: HOSPADM

## 2023-01-03 RX ORDER — SODIUM CHLORIDE 0.9 % (FLUSH) 0.9 %
5-40 SYRINGE (ML) INJECTION AS NEEDED
Status: DISCONTINUED | OUTPATIENT
Start: 2023-01-03 | End: 2023-01-06 | Stop reason: HOSPADM

## 2023-01-03 RX ORDER — HYDROMORPHONE HYDROCHLORIDE 1 MG/ML
0.5 INJECTION, SOLUTION INTRAMUSCULAR; INTRAVENOUS; SUBCUTANEOUS
Status: ACTIVE | OUTPATIENT
Start: 2023-01-03 | End: 2023-01-04

## 2023-01-03 RX ADMIN — SODIUM CHLORIDE, PRESERVATIVE FREE 10 ML: 5 INJECTION INTRAVENOUS at 15:00

## 2023-01-03 RX ADMIN — PHENYLEPHRINE HYDROCHLORIDE 80 MCG: 10 INJECTION INTRAVENOUS at 11:46

## 2023-01-03 RX ADMIN — FENTANYL CITRATE 25 MCG: 50 INJECTION INTRAMUSCULAR; INTRAVENOUS at 12:41

## 2023-01-03 RX ADMIN — PHENYLEPHRINE HYDROCHLORIDE 80 MCG: 10 INJECTION INTRAVENOUS at 11:15

## 2023-01-03 RX ADMIN — PREGABALIN 75 MG: 75 CAPSULE ORAL at 08:27

## 2023-01-03 RX ADMIN — Medication 10 MG: at 09:57

## 2023-01-03 RX ADMIN — PHENYLEPHRINE HYDROCHLORIDE 80 MCG: 10 INJECTION INTRAVENOUS at 11:35

## 2023-01-03 RX ADMIN — SODIUM CHLORIDE 125 ML/HR: 9 INJECTION, SOLUTION INTRAVENOUS at 22:15

## 2023-01-03 RX ADMIN — KETOROLAC TROMETHAMINE 15 MG: 30 INJECTION, SOLUTION INTRAMUSCULAR; INTRAVENOUS at 18:09

## 2023-01-03 RX ADMIN — ATORVASTATIN CALCIUM 20 MG: 40 TABLET, FILM COATED ORAL at 21:11

## 2023-01-03 RX ADMIN — ACETAMINOPHEN 500 MG: 500 TABLET, FILM COATED ORAL at 21:11

## 2023-01-03 RX ADMIN — TRANEXAMIC ACID 1000 MG: 100 INJECTION, SOLUTION INTRAVENOUS at 09:35

## 2023-01-03 RX ADMIN — DOXYCYCLINE HYCLATE 100 MG: 100 TABLET, COATED ORAL at 18:09

## 2023-01-03 RX ADMIN — SODIUM CHLORIDE, PRESERVATIVE FREE 10 ML: 5 INJECTION INTRAVENOUS at 21:12

## 2023-01-03 RX ADMIN — Medication 10 MG: at 09:42

## 2023-01-03 RX ADMIN — ACETAMINOPHEN 500 MG: 500 TABLET, FILM COATED ORAL at 18:08

## 2023-01-03 RX ADMIN — SODIUM CHLORIDE, POTASSIUM CHLORIDE, SODIUM LACTATE AND CALCIUM CHLORIDE 125 ML/HR: 600; 310; 30; 20 INJECTION, SOLUTION INTRAVENOUS at 08:37

## 2023-01-03 RX ADMIN — PROPOFOL 80 MCG/KG/MIN: 10 INJECTION, EMULSION INTRAVENOUS at 09:15

## 2023-01-03 RX ADMIN — Medication 10 MG: at 11:46

## 2023-01-03 RX ADMIN — PHENYLEPHRINE HYDROCHLORIDE 80 MCG: 10 INJECTION INTRAVENOUS at 10:45

## 2023-01-03 RX ADMIN — CEFAZOLIN 2 G: 1 INJECTION, POWDER, FOR SOLUTION INTRAMUSCULAR; INTRAVENOUS at 18:09

## 2023-01-03 RX ADMIN — FENTANYL CITRATE 25 MCG: 50 INJECTION INTRAMUSCULAR; INTRAVENOUS at 13:01

## 2023-01-03 RX ADMIN — PHENYLEPHRINE HYDROCHLORIDE 80 MCG: 10 INJECTION INTRAVENOUS at 10:52

## 2023-01-03 RX ADMIN — SENNOSIDES AND DOCUSATE SODIUM 1 TABLET: 50; 8.6 TABLET ORAL at 18:09

## 2023-01-03 RX ADMIN — FENTANYL CITRATE 25 MCG: 50 INJECTION INTRAMUSCULAR; INTRAVENOUS at 11:00

## 2023-01-03 RX ADMIN — DONEPEZIL HYDROCHLORIDE 5 MG: 5 TABLET, FILM COATED ORAL at 21:11

## 2023-01-03 RX ADMIN — ACETAMINOPHEN 1000 MG: 500 TABLET, FILM COATED ORAL at 08:27

## 2023-01-03 RX ADMIN — PHENYLEPHRINE HYDROCHLORIDE 80 MCG: 10 INJECTION INTRAVENOUS at 11:27

## 2023-01-03 RX ADMIN — FENTANYL CITRATE 25 MCG: 50 INJECTION INTRAMUSCULAR; INTRAVENOUS at 09:26

## 2023-01-03 RX ADMIN — SODIUM CHLORIDE, POTASSIUM CHLORIDE, SODIUM LACTATE AND CALCIUM CHLORIDE: 600; 310; 30; 20 INJECTION, SOLUTION INTRAVENOUS at 09:10

## 2023-01-03 RX ADMIN — Medication 10 MG: at 09:49

## 2023-01-03 RX ADMIN — PHENYLEPHRINE HYDROCHLORIDE 80 MCG: 10 INJECTION INTRAVENOUS at 10:24

## 2023-01-03 RX ADMIN — PHENYLEPHRINE HYDROCHLORIDE 40 MCG: 10 INJECTION INTRAVENOUS at 10:00

## 2023-01-03 RX ADMIN — GLYCOPYRROLATE 0.2 MG: 0.2 INJECTION INTRAMUSCULAR; INTRAVENOUS at 09:42

## 2023-01-03 RX ADMIN — SODIUM CHLORIDE 125 ML/HR: 9 INJECTION, SOLUTION INTRAVENOUS at 14:00

## 2023-01-03 RX ADMIN — PHENYLEPHRINE HYDROCHLORIDE 80 MCG: 10 INJECTION INTRAVENOUS at 10:34

## 2023-01-03 RX ADMIN — BUPIVACAINE HYDROCHLORIDE 12 MG: 5 INJECTION, SOLUTION EPIDURAL; INTRACAUDAL; PERINEURAL at 09:21

## 2023-01-03 RX ADMIN — PHENYLEPHRINE HYDROCHLORIDE 40 MCG: 10 INJECTION INTRAVENOUS at 10:17

## 2023-01-03 RX ADMIN — CELECOXIB 200 MG: 200 CAPSULE ORAL at 08:27

## 2023-01-03 RX ADMIN — WATER 2 G: 1 INJECTION INTRAMUSCULAR; INTRAVENOUS; SUBCUTANEOUS at 09:33

## 2023-01-03 NOTE — DISCHARGE SUMMARY
[address] SUMMARY     Name: Prakash Colon       MR#: [id number]    : 1945  ADMIT DATE: 1/3/2023  DISCHARGE DATE: 2023     ADMISSION DIAGNOSIS: Primary osteoarthritis of left hip [M16.12]  S/P total left hip arthroplasty [Z96.642]     DISCHARGE DIAGNOSIS: OSTEOARTHRITIS     PROCEDURE PERFORMED: Procedure(s):  LEFT TOTAL HIP ARTHROPLASTY ANTERIOR APPROACH     CONSULTATIONS:  Hospitalist.     HISTORY OF PRESENT ILLNESS: The patient is a 76-year-old gentleman with progressive left hip and groin pain due to severe osteoarthritis of left hip. Symptoms have progressed despite comprehensive conservative treatment, and he presents for left total hip replacement. Risks, benefits, and alternatives of procedure were reviewed with him in detail and he desires to proceed. HOSPITAL COURSE:  The patient underwent the aforementioned procedure on date of admission under spinal anesthesia. There were no immediate postoperative complications. He was started on a multimodal pain regimen and DVT prophylaxis. DISPOSITION: The patient made slow, steady progress with physical therapy and was appropriate for discharge to Home in stable condition on postoperative day 3. DISCHARGE MEDICATIONS:  Reinitiate preadmission medications. In addition, the patient will be on half dose Eliquis for DVT prophylaxis and low dose oxycodone and Tylenol for pain. DISCHARGE INSTRUCTIONS:  Detailed printed instructions were provided to the patient. Follow up with Dr. Jamaal He in approximately 3 weeks. The patient will receive home health physical therapy in the meantime.     Signed by: Tristan Vazquez PA-C  2023

## 2023-01-03 NOTE — BRIEF OP NOTE
Brief Postoperative Note    Patient: Adriel Garcia  YOB: 1945  MRN: [id number]    Date of Procedure: 1/3/2023     Pre-Op Diagnosis: OSTEOARTHRITIS    Post-Op Diagnosis:  oa left hip       Procedure(s):  LEFT TOTAL HIP ARTHROPLASTY ANTERIOR APPROACH    Surgeon(s):  MD Yaneli Rader MD    Surgical Assistant: Physician Assistant: Jodi Bowman PA-C  Surg Asst-1: Liu Beckham    Anesthesia: Spinal     Estimated Blood Loss (mL): 722     Complications: None    Specimens: * No specimens in log *     Implants:   Implant Name Type Inv.  Item Serial No.  Lot No. LRB No. Used Action   CUP ACET CLUS HOLE H 56 MM W/ DOME HOLE PLUG P2 COAT EMPOWR - SNA  CUP ACET CLUS HOLE H 56 MM W/ DOME HOLE PLUG P2 COAT EMPOWR NA [employer] Diabetes  [employer] [facility] [id number] Left 1 Implanted   LINER ACET NEUT H [id number] MM HIP HXE+ VIT E POLYETH EMPOWR - SNA  LINER ACET NEUT H 40X58 MM HIP HXE+ VIT E POLYETH EMPOWR NA [employer] [facility] [id number] Left 1 Implanted   SCREW BNE 25 MM ACET EMPOWR - SNA  SCREW BNE 25 MM ACET EMPOWR NA [facility] [employer] [facility] [id number] Left 1 Implanted   SCREW BNE 40 MM ACET EMPOWR - SNA  SCREW BNE 40 MM ACET EMPOWR NA [facility] codebender  [employer] [facility] [id number] Left 1 Implanted   STEM FEM STD OFFSET 13  MM 37 MM HIP CMNTLS CLLRLSS TI - SNA  STEM FEM STD OFFSET 13  MM 37 MM HIP CMNTLS CLLRLSS TI NA [facility] [employer] [facility] [id number] Left 1 Implanted   HEAD FEM 4+ MM 40 MM HIP OFFSET FOR FMP SYS BIOLOX DELT CERM - SNA  HEAD FEM 4+ MM 40 MM HIP OFFSET FOR FMP SYS BIOLOX DELT CERM NA [facility] codebender Makayla [employer]ina[employer] SURGICAL_ [id number] Left 1 Implanted       Drains: * No LDAs found *    Findings: oa left hip    Electronically Signed by Emily Ayers PA-C on 1/3/2023 at 12:36 PM

## 2023-01-03 NOTE — DISCHARGE INSTRUCTIONS
Post-op Discharge Instructions Following Total Joint Replacement  Kristen Cook MD  [address]  [phone]  Follow-up Office Visit  See Dr. Jonathan Palmer approximately 3-4 weeks from date of surgery. Call [phone] to make an appointment. Call Jacobo Lyn LPN if you have questions or concerns, [phone]. Activity  Use your walker for ambulation. Weight bearing as tolerated unless instructed otherwise by the physical therapist. Get up every hour you are awake and take a brief walk. Lengthen walking distance daily as your strength improves. Continue using your walker until seen in the office for your first follow up visit. Practice your exercises 3 times daily as instructed by the physical therapist. Brannon Schlatter for 20 minutes after exercising. No driving until seen in the office for your first follow up visit. Incision Care  The light brown Aquacel surgical dressing is waterproof and is to remain on your incision for 7 days. On the 7th day, carefully lift the edge of the dressing to break the adhesive seal and gently peel it off. If your Aquacel dressings comes loose or falls off before the 7th day, replace it with a dry sterile gauze dressing and change this dressing daily. Once there is no drainage on the bandage, you mean leave the incision open to air. You may take a shower with the Aquacel dressing in place. After you remove the Aquacel dressing on day 7, you may continue to shower and get your incision wet in the shower. Do not submerge your incision under water in a bathtub, hot tub, swimming pool, etc. until after you have been evaluated at your first office visit. Medications  Blood Clot Prevention: Take medication as prescribed by your physician for 4 weeks postop. Pain Management: Take pain medication as prescribed; wean yourself off of pain medication as your pain lessens. Take with food. You make also take Tylenol every 4-6 hours as needed for pain.   Do not exceed 3 grams (3000mg) per day. Place an ice bag on or around the incision for 20 minutes on / 20 minutes off as needed throughout the day and night, especially after exercising. Stool Softener: You may want to take a stool softener (such as Senokot-S or Colace) to prevent constipation while taking pain medication. If constipation occurs, you may also use a laxative (such as Dulcolax tablets, Miralax, or a suppository). Diet  Resume usual diet at home. Drink plenty of fluids. Eat foods high in fiber and protein. Calcium and Vitamin D supplements recommended. Avoid alcoholic beverages. No smoking. When to call your Orthopaedic Surgeon: If you call after 5pm or on a weekend, the on call physician will return your call  Pain that is not relieved by pain medication, ice, and activity modification  Signs of infection (red incision, continuous drainage from the incision, malodorous drainage, persistent fever greater than 101 degrees Fahrenheit)  Signs of a blood clot in your leg (calf pain, tenderness, redness, and/or swelling of the lower leg)  ?   When to call your Primary Care Physician  Concerns about your medical conditions such as diabetes, high blood pressure, asthma, congestive heart failure  Call if blood sugars are elevated, if you have a persistent headache or dizziness, coughing or congestion, constipation or diarrhea, burning with urination, abnormal heart rate (fast or slow)  When to call 911 and go to the nearest Emergency Room  Acute onset of chest pain, shortness of breath, difficulty breathing

## 2023-01-03 NOTE — PROGRESS NOTES
Problem: Mobility Impaired (Adult and Pediatric)  Goal: *Acute Goals and Plan of Care (Insert Text)  Description: FUNCTIONAL STATUS PRIOR TO ADMISSION: Patient was independent and active without use of DME.    HOME SUPPORT PRIOR TO ADMISSION: The patient lived with wife but did not require assist.    Physical Therapy Goals  Initiated 1/3/2023    1. Patient will move from supine to sit and sit to supine , scoot up and down, and roll side to side in bed with modified independence within 4 days. 2. Patient will perform sit to stand with modified independence within 4 days. 3. Patient will ambulate with modified independence for 150 feet with the least restrictive device within 4 days. 4. Patient will ascend/descend 4 stairs with cane and one handrail(s) with modified independence within 4 days. 5. Patient will perform post-AYDE home exercise program per protocol with independence within 4 days. 1/3/2023 1835 by Shashank Ma  Outcome: Progressing Towards Goal   PHYSICAL THERAPY EVALUATION  Patient: Luis Bernardo (59 y.o. male)  Date: 1/3/2023  Primary Diagnosis: Primary osteoarthritis of left hip [M16.12]  Procedure(s) (LRB):  LEFT TOTAL HIP ARTHROPLASTY ANTERIOR APPROACH (Left) Day of Surgery   Precautions:   Fall, WBAT    ASSESSMENT  Based on the objective data described below, the patient presents with  impairment in functional mobility, activity tolerance and balance s/p L AYDE. Had R AYDE and revision in the past. PLOF: Independent with ADLs and IADLs. Patient lives in a one story home with wife with no steps to enter. Patient's mobility was on target for POD#0. Will address more exercises, increase gait distance, negotiate stairs and assess for discharge at am PT session tomorrow. Patient instructed NOT to get up from bed, chair or commode without calling for assistance. Initiated post AYDE exercise protocol and wrote same on [employer].  Anticipate discharge after am PT session if blood pressure is stable. Current Level of Function Impacting Discharge (mobility/balance): Contact guard assistance for all mobility. Only walked a short distance due to low BP. Functional Outcome Measure: The patient scored 55/100 on the Barthel outcome measure which is indicative of moderate impaired ability to care for basic self-needs/dependency on others. Other factors to consider for discharge: Motivated/A & O x 4/Supportive Family/Independent PLOF      Patient will benefit from skilled therapy intervention to address the above noted impairments. PLAN :  Recommendations and Planned Interventions: bed mobility training, transfer training, gait training, therapeutic exercises, patient and family training/education, and therapeutic activities      Frequency/Duration: Patient will be followed by physical therapy:  twice daily to address goals. Recommendation for discharge: (in order for the patient to meet his/her long term goals)  Physical therapy at least 2 days/week in the home     This discharge recommendation:  Has been made in collaboration with the attending provider and/or case management    IF patient discharges home will need the following DME: patient owns DME required for discharge         SUBJECTIVE:   Patient stated I am doing okay.     OBJECTIVE DATA SUMMARY:   HISTORY:    Past Medical History:   Diagnosis Date    Abdominal aortic aneurysm (AAA) without rupture     Adverse effect of anesthesia 2020    confusion and memory loss after anesthesia    Arrhythmia     AFIB    Arthropathy     Basal cell carcinoma     LOW BACK, HEAD, NECK    Cerebrovascular disease     Chronic heart failure with preserved ejection fraction (HCC)     Dyslipidemia     Erectile dysfunction     Essential hypertension     GERD (gastroesophageal reflux disease)     H/O maze procedure     Heart failure ([address]     CHF    Hypertension     Mild intermittent asthma     Osteoarthritis of right hip     Psychiatric disorder     DEPRESSION    S/P AVR (aortic valve replacement) and aortoplasty     Sleep apnea     CPAP USE    Stroke ([facility] [address] 2011    DURING SURGERY FOR AORTIC VALVE     Past Surgical History:   Procedure Laterality Date    HX COLONOSCOPY      HX HEART VALVE SURGERY  2011    AORTIC VALVE    HX HEENT Bilateral     CATARACTS    HX HIP REPLACEMENT Right     4/2014 and 5/20/20    HX OTHER SURGICAL  2022    Alzheimer's    HX TONSIL AND ADENOIDECTOMY      MD UNLISTED PROCEDURE CARDIAC SURGERY  2011    MD UNLISTED PROCEDURE VASCULAR SURGERY  2021    peripheral left leg       Personal factors and/or comorbidities impacting plan of care: Motivated/A & O x 4/Supportive Family/Independent PLOF     Home Situation  Home Environment: Private residence  # Steps to Enter: 0  Rails to Enter: No  One/Two Story Residence: One story  Living Alone: No  Support Systems: Spouse/Significant Other  Patient Expects to be Discharged to[de-identified] Home with family assistance  Current DME Used/Available at Home: Cane, straight, Grab bars, Raised toilet seat, Walker, rolling, Blood pressure cuff, CPAP  Tub or Shower Type: Shower    EXAMINATION/PRESENTATION/DECISION MAKING:   Critical Behavior:  Neurologic State: Alert, Appropriate for age  Orientation Level: Oriented X4  Cognition: Follows commands     Hearing:     Skin:  Aquacel Intact with no drainage appreciated.    Edema: Normal post-op inflammation   Range Of Motion:  AROM: Generally decreased, functional           PROM: Generally decreased, functional           Strength:    Strength: Generally decreased, functional                    Tone & Sensation:   Tone: Normal              Sensation: Intact               Coordination:  Coordination: Generally decreased, functional  Vision:      Functional Mobility:  Bed Mobility:     Supine to Sit: Contact guard assistance  Sit to Supine: Contact guard assistance  Scooting: Contact guard assistance  Transfers:  Sit to Stand: Contact guard assistance  Stand to Sit: Contact guard assistance                       Balance:   Sitting: Intact  Standing: Impaired; Without support  Standing - Static: Good;Constant support  Standing - Dynamic : Good;Constant support  Ambulation/Gait Training:  Distance (ft): 25 Feet (ft)  Assistive Device: Walker, rolling;Gait belt  Ambulation - Level of Assistance: Contact guard assistance        Gait Abnormalities: Antalgic;Decreased step clearance     Left Side Weight Bearing: As tolerated  Base of Support: Shift to right  Stance: Left decreased  Speed/Cleopatra: Slow  Step Length: Right shortened  Swing Pattern: Left asymmetrical     Interventions: Safety awareness training;Verbal cues                 Therapeutic Exercises: Ankle Pumps  Glute Sets  Quad Sets  Heel Slides  X 10 each every hour     Functional Measure:  Barthel Index:    Bathin  Bladder: 10  Bowels: 10  Groomin  Dressin  Feeding: 10  Mobility: 0  Stairs: 0  Toilet Use: 5  Transfer (Bed to Chair and Back): 10  Total: 55/100       The Barthel ADL Index: Guidelines  1. The index should be used as a record of what a patient does, not as a record of what a patient could do. 2. The main aim is to establish degree of independence from any help, physical or verbal, however minor and for whatever reason. 3. The need for supervision renders the patient not independent. 4. A patient's performance should be established using the best available evidence. Asking the patient, friends/relatives and nurses are the usual sources, but direct observation and common sense are also important. However direct testing is not needed. 5. Usually the patient's performance over the preceding 24-48 hours is important, but occasionally longer periods will be relevant. 6. Middle categories imply that the patient supplies over 50 per cent of the effort. 7. Use of aids to be independent is allowed.     Score Interpretation (from [facility])    Independent   60-79 Minimally independent 40-59 Partially dependent   20-39 Very dependent   <20 Totally dependent     -Summer Gallagher, Barthel, D.W. (5564). Functional evaluation: the Barthel Index. [address] ([address] (). The Barthel activities of daily living index: self-reporting versus actual performance in the old (> or = 75 years). Journal of [address] [place] [address] 45(7), [facility], DERIK, Jenn Dumont., Crystal Alvarez. (1999). Measuring the change in disability after inpatient rehabilitation; comparison of the responsiveness of the Barthel Index and Functional [place] Measure. Journal of Neurology, Neurosurgery, and Psychiatry, 66(4), 385-851. Jessica Alejandro, N.J.A, NETTA Mejia, & Gael Holder MRamboA. (2004) Assessment of post-stroke quality of life in cost-effectiveness studies: The usefulness of the Barthel Index and the EuroQoL-5D.  Quality of Life Research, 15, 510-82           Physical Therapy Evaluation Charge Determination   History Examination Presentation Decision-Making   LOW Complexity : Zero comorbidities / personal factors that will impact the outcome / POC LOW Complexity : 1-2 Standardized tests and measures addressing body structure, function, activity limitation and / or participation in recreation  LOW Complexity : Stable, uncomplicated  LOW Complexity : FOTO score of       Based on the above components, the patient evaluation is determined to be of the following complexity level: LOW     Pain Ratin/10    Activity Tolerance:   Fair-low BP but no c/os of dizziness  Vitals:    23 1537 23 1545 23 1621 23 1730   BP: (!) 93/59 (!) 96/57 106/67 (!) 98/58   BP 1 Location: Right upper arm Right upper arm Right upper arm Right upper arm   BP Patient Position: At rest Sitting  Comment: after walking 15 ft At rest At rest   Pulse: 62  62 61   Temp: 97.5 °F (36.4 °C)  97.4 °F (36.3 °C) 97.9 °F (36.6 °C)   Resp: 18  18 18   Height: Weight:       SpO2: 93%  95% 93%        After treatment patient left in no apparent distress:   Supine in bed, Call bell within reach, Caregiver / family present, Side rails x 3, and nurse notified. COMMUNICATION/EDUCATION:   The patients plan of care was discussed with: Registered nurse and Physician. Fall prevention education was provided and the patient/caregiver indicated understanding., Patient/family have participated as able in goal setting and plan of care. , and Patient/family agree to work toward stated goals and plan of care.     Thank you for this referral.  Ellen Buckley   Time Calculation: 25 mins

## 2023-01-03 NOTE — PROGRESS NOTES
Ortho NP Note    POD# 0  s/p LEFT TOTAL HIP ARTHROPLASTY ANTERIOR APPROACH     Pt resting in bed. No complaints. Denies pain at present. Per wife, patient has used oxycodone in past from previous right AYDE, well tolerated. Aware of pending PT evaluation and agreeable. Family reports dx of recent PNA dx. Seen at Urgent Care in [place] with doxycycline prescribed and started at home. Treatment course 12/30-1/8. Wife's concern is that with previous posterior hip patient had considerable challenge transitioning from bed/chair to standing position. Wife reports that she would like patient to be \"as independent as possible\" in transfers prior to return to home. Patient has not had something to eat/drink. No nausea/vomiting. Denies CP, SOB. No dizziness, lightheadedness. Denies fever/chills. VSS Afebrile. Visit Vitals  /63   Pulse 61   Temp 97.3 °F (36.3 °C)   Resp 17   Ht 5' 11\" (1.803 m)   Wt 113.4 kg (250 lb)   SpO2 96%   BMI 34.87 kg/m²       Most Recent Labs:   Lab Results   Component Value Date/Time    HGB 12.8 05/20/2020 12:00 AM    Hemoglobin A1c 5.6 12/27/2022 11:38 AM       Body mass index is 34.87 kg/m². Reference: BMI greater than 30 is classified as obesity and greater than 40 is classified as morbid obesity. STOP BANG Score:   +JULES dx. Pt uses CPAP. Voiding status: pending spontaneous void     Aquacel to left hip c.d.i. Cryotherapy in place over incision. Bilateral LEs warm, dry. 1+ DP pulses  Sensation and motor intact. DF/PF/EHL 5/5. SCDs for mechanical DVT proph    Plan:  1) PT: starting today, WBAT  2) Adriana-op Antibiotics Ancef  3) Pain:  Received tylenol, Celebrex, Lyrica in preop.  Perioperative anesthesia: Spinal.  Post operative analgesia includes scheduled tylenol, toradol and PRN oxycodone, IV dilaudid  4) DVT Prophylaxis:  Eliquis 2.5 mg PO BID in post op period, continue half dose x 5 days post op prior to resuming home dose of Eliquis 5 mg PO BID . Encouraged early mobilization, bed exercises, and SCD use. 5) Paroxysmal Atrial Fibrillation: Continue Toprol XL daily. Anticoagulation as per above. Continue routine VS.   6) JULES: CPAP order set placed, at bedside. 7) Presumed PNA, present on admission: Dx'ed at OSH, continuing prescribed doxycycline 100 mg PO BID for 10 days therapy (12/30-1/8). Instructed on/encouraged IS use   8) Discharge plans: to home with wife's support. Rx: [facility] [employer]. DME: nisha blanton.       Shreyas Cohen NP

## 2023-01-03 NOTE — PERIOP NOTES
TRANSFER - OUT REPORT:    Verbal report given to ayleen see(name) on Doris Mcnair  being transferred to (unit) for routine post - op       Report consisted of patients Situation, Background, Assessment and   Recommendations(SBAR). Time Pre op antibiotic [id number]  Anesthesia Stop time: 1021    Information from the following report(s) SBAR, Kardex, OR Summary, Procedure Summary, Intake/Output, MAR, Recent Results, and Cardiac Rhythm sr  was reviewed with the receiving nurse. Opportunity for questions and clarification was provided. Is the patient on 02? YES       L/Min 2    Is the patient on a monitor? NO    Is the nurse transporting with the patient? NO    Surgical Waiting Area notified of patient's transfer from PACU? YES      The following personal items collected during your admission accompanied patient upon transfer:   Dental Appliance: Dental Appliances: Other (comment) (bag of clothes, glasses, and earing aids returned to pt in PACU)  Vision: Visual Aid: Glasses (glasses to pacu)  Hearing Aid: Hearing Aid: Bilateral (hearing aid to pacu)  Jewelry: Jewelry: None  Clothing: Clothing: Footwear, Pants, Shirt (clothes to pacu)  Other Valuables:  Other Valuables: Eyeglasses (glasses to pacu)  Valuables sent to safe:

## 2023-01-03 NOTE — ANESTHESIA PROCEDURE NOTES
Spinal Block    Start time: 1/3/2023 9:14 AM  End time: 1/3/2023 9:21 AM  Performed by: Valente Gonzales CRNA  Authorized by: Roscoe Baez MD     Pre-procedure:   Indications: primary anesthetic  Preanesthetic Checklist: patient identified, risks and benefits discussed, anesthesia consent, site marked, patient being monitored, timeout performed and fire risk safety assessment completed and verbalized    Timeout Time: 09:14 EST      Spinal Block:   Patient Position:  Seated  Prep Region:  Lumbar  Prep: Betadine      Location:  L4-5  Technique:  Single shot  Local: bupivacaine (PF) (MARCAINE) 0.5 % (5 mg/mL) intrathecal - Intrathecal   12 mg - 1/3/2023 9:21:00 AM    Med Admin Time: 1/3/2023 9:21 AM    Needle:   Needle Type:  Pencil-tip  Needle Gauge:  25 G  Attempts:  1      Events: CSF confirmed, no blood with aspiration and no paresthesia        Assessment:  Insertion:  Uncomplicated  Patient tolerance:  Patient tolerated the procedure well with no immediate complications  MD Franny Thibodeaux present for proceduure

## 2023-01-03 NOTE — ANESTHESIA PREPROCEDURE EVALUATION
Relevant Problems   RESPIRATORY SYSTEM   (+) Mild intermittent asthma   (+) Obstructive sleep apnea      CARDIOVASCULAR   (+) Arrhythmia   (+) Chronic atrial fibrillation (HCC)   (+) Essential hypertension      RENAL FAILURE   (+) Kidney stone       Anesthetic History   No history of anesthetic complications            Review of Systems / Medical History  Patient summary reviewed, nursing notes reviewed and pertinent labs reviewed    Pulmonary        Sleep apnea: CPAP    Asthma        Neuro/Psych       CVA  Dementia     Cardiovascular    Hypertension        Dysrhythmias (s/p MAZE) : atrial fibrillation        Comments: S/p AVR  ECHO 6/17/20: normal LVEF 55-60% with indeterminate diastolic function. Bioprosthetic aortic valve present. The prosthetic valve is normal. Mild-mod TR with mild PHTN   GI/Hepatic/Renal     GERD           Endo/Other  Within defined limits           Other Findings   Comments:  On Eliquis-last dose 12/30/22         Physical Exam    Airway  Mallampati: II  TM Distance: 4 - 6 cm  Neck ROM: normal range of motion   Mouth opening: Normal     Cardiovascular    Rhythm: regular  Rate: normal         Dental  No notable dental hx       Pulmonary  Breath sounds clear to auscultation               Abdominal  GI exam deferred       Other Findings            Anesthetic Plan    ASA: 3  Anesthesia type: spinal          Induction: Intravenous  Anesthetic plan and risks discussed with: Patient

## 2023-01-03 NOTE — PROGRESS NOTES
Problem: Falls - Risk of  Goal: *Absence of Falls  Description: Document Brayan Jefferss Fall Risk and appropriate interventions in the flowsheet.   Outcome: Progressing Towards Goal  Note: Fall Risk Interventions:  Mobility Interventions: Bed/chair exit alarm, Communicate number of staff needed for ambulation/transfer, Patient to call before getting OOB         Medication Interventions: Patient to call before getting OOB    Elimination Interventions: Call light in reach              Problem: Patient Education: Go to Patient Education Activity  Goal: Patient/Family Education  Outcome: Progressing Towards Goal  Note: Pain management  Fall prevention  Incentive spirometer 10x/hour

## 2023-01-03 NOTE — ANESTHESIA POSTPROCEDURE EVALUATION
Procedure(s):  LEFT TOTAL HIP ARTHROPLASTY ANTERIOR APPROACH.    spinal    Anesthesia Post Evaluation      Multimodal analgesia: multimodal analgesia used between 6 hours prior to anesthesia start to PACU discharge  Patient location during evaluation: PACU  Level of consciousness: awake  Pain management: adequate  Airway patency: patent  Anesthetic complications: no  Cardiovascular status: acceptable  Respiratory status: acceptable  Hydration status: acceptable  Post anesthesia nausea and vomiting:  none  Final Post Anesthesia Temperature Assessment:  Normothermia (36.0-37.5 degrees C)      INITIAL Post-op Vital signs:   Vitals Value Taken Time   /58 01/03/23 1345   Temp 36.8 °C (98.3 °F) 01/03/23 1317   Pulse 62 01/03/23 1353   Resp 17 01/03/23 1353   SpO2 93 % 01/03/23 1353   Vitals shown include unvalidated device data.

## 2023-01-04 ENCOUNTER — APPOINTMENT (OUTPATIENT)
Dept: GENERAL RADIOLOGY | Age: 78
DRG: 469 | End: 2023-01-04
Payer: MEDICARE

## 2023-01-04 LAB
ANION GAP SERPL CALC-SCNC: 6 MMOL/L (ref 5–15)
BNP SERPL-MCNC: 821 PG/ML
BUN SERPL-MCNC: 32 MG/DL (ref 6–20)
BUN/CREAT SERPL: 22 (ref 12–20)
CALCIUM SERPL-MCNC: 8.5 MG/DL (ref 8.5–10.1)
CHLORIDE SERPL-SCNC: 103 MMOL/L (ref 97–108)
CO2 SERPL-SCNC: 30 MMOL/L (ref 21–32)
CREAT SERPL-MCNC: 1.48 MG/DL (ref 0.7–1.3)
GLUCOSE SERPL-MCNC: 111 MG/DL (ref 65–100)
HGB BLD-MCNC: 11.2 G/DL (ref 12.1–17)
POTASSIUM SERPL-SCNC: 3.3 MMOL/L (ref 3.5–5.1)
SODIUM SERPL-SCNC: 139 MMOL/L (ref 136–145)
TROPONIN-HIGH SENSITIVITY: 46 NG/L (ref 0–76)

## 2023-01-04 PROCEDURE — 83880 ASSAY OF NATRIURETIC PEPTIDE: CPT

## 2023-01-04 PROCEDURE — 77030041034 HC KT HIP PATT -B

## 2023-01-04 PROCEDURE — 71045 X-RAY EXAM CHEST 1 VIEW: CPT

## 2023-01-04 PROCEDURE — 74011250637 HC RX REV CODE- 250/637: Performed by: NURSE PRACTITIONER

## 2023-01-04 PROCEDURE — 74011000250 HC RX REV CODE- 250: Performed by: PHYSICIAN ASSISTANT

## 2023-01-04 PROCEDURE — 74011250636 HC RX REV CODE- 250/636

## 2023-01-04 PROCEDURE — G0378 HOSPITAL OBSERVATION PER HR: HCPCS

## 2023-01-04 PROCEDURE — 74011250637 HC RX REV CODE- 250/637

## 2023-01-04 PROCEDURE — 77030028907 HC WRP KNEE WO BGS SOLM -B

## 2023-01-04 PROCEDURE — 2709999900 HC NON-CHARGEABLE SUPPLY

## 2023-01-04 PROCEDURE — 85018 HEMOGLOBIN: CPT

## 2023-01-04 PROCEDURE — 80048 BASIC METABOLIC PNL TOTAL CA: CPT

## 2023-01-04 PROCEDURE — 97530 THERAPEUTIC ACTIVITIES: CPT

## 2023-01-04 PROCEDURE — 77010033678 HC OXYGEN DAILY

## 2023-01-04 PROCEDURE — 74011250637 HC RX REV CODE- 250/637: Performed by: PHYSICIAN ASSISTANT

## 2023-01-04 PROCEDURE — 97165 OT EVAL LOW COMPLEX 30 MIN: CPT

## 2023-01-04 PROCEDURE — 84484 ASSAY OF TROPONIN QUANT: CPT

## 2023-01-04 PROCEDURE — 74011250636 HC RX REV CODE- 250/636: Performed by: PHYSICIAN ASSISTANT

## 2023-01-04 PROCEDURE — 97116 GAIT TRAINING THERAPY: CPT

## 2023-01-04 PROCEDURE — 36415 COLL VENOUS BLD VENIPUNCTURE: CPT

## 2023-01-04 PROCEDURE — 97535 SELF CARE MNGMENT TRAINING: CPT

## 2023-01-04 RX ORDER — POTASSIUM CHLORIDE 750 MG/1
40 TABLET, FILM COATED, EXTENDED RELEASE ORAL
Status: COMPLETED | OUTPATIENT
Start: 2023-01-04 | End: 2023-01-04

## 2023-01-04 RX ORDER — TORSEMIDE 20 MG/1
20 TABLET ORAL DAILY
Status: DISCONTINUED | OUTPATIENT
Start: 2023-01-04 | End: 2023-01-05

## 2023-01-04 RX ADMIN — SODIUM CHLORIDE 500 ML: 9 INJECTION, SOLUTION INTRAVENOUS at 10:08

## 2023-01-04 RX ADMIN — ATORVASTATIN CALCIUM 20 MG: 40 TABLET, FILM COATED ORAL at 21:55

## 2023-01-04 RX ADMIN — ACETAMINOPHEN 500 MG: 500 TABLET, FILM COATED ORAL at 21:55

## 2023-01-04 RX ADMIN — METOPROLOL SUCCINATE 50 MG: 50 TABLET, EXTENDED RELEASE ORAL at 09:51

## 2023-01-04 RX ADMIN — APIXABAN 2.5 MG: 2.5 TABLET, FILM COATED ORAL at 21:55

## 2023-01-04 RX ADMIN — KETOROLAC TROMETHAMINE 15 MG: 30 INJECTION, SOLUTION INTRAMUSCULAR; INTRAVENOUS at 00:30

## 2023-01-04 RX ADMIN — POLYETHYLENE GLYCOL 3350 17 G: 17 POWDER, FOR SOLUTION ORAL at 09:51

## 2023-01-04 RX ADMIN — SENNOSIDES AND DOCUSATE SODIUM 1 TABLET: 50; 8.6 TABLET ORAL at 09:50

## 2023-01-04 RX ADMIN — ESCITALOPRAM OXALATE 5 MG: 10 TABLET ORAL at 09:49

## 2023-01-04 RX ADMIN — DOXYCYCLINE HYCLATE 100 MG: 100 TABLET, COATED ORAL at 18:00

## 2023-01-04 RX ADMIN — ACETAMINOPHEN 500 MG: 500 TABLET, FILM COATED ORAL at 13:28

## 2023-01-04 RX ADMIN — DONEPEZIL HYDROCHLORIDE 5 MG: 5 TABLET, FILM COATED ORAL at 21:55

## 2023-01-04 RX ADMIN — SENNOSIDES AND DOCUSATE SODIUM 1 TABLET: 50; 8.6 TABLET ORAL at 17:59

## 2023-01-04 RX ADMIN — ACETAMINOPHEN 500 MG: 500 TABLET, FILM COATED ORAL at 07:13

## 2023-01-04 RX ADMIN — TORSEMIDE 20 MG: 20 TABLET ORAL at 13:29

## 2023-01-04 RX ADMIN — POTASSIUM CHLORIDE 40 MEQ: 750 TABLET, FILM COATED, EXTENDED RELEASE ORAL at 13:28

## 2023-01-04 RX ADMIN — SODIUM CHLORIDE, PRESERVATIVE FREE 10 ML: 5 INJECTION INTRAVENOUS at 14:00

## 2023-01-04 RX ADMIN — ACETAMINOPHEN 500 MG: 500 TABLET, FILM COATED ORAL at 09:49

## 2023-01-04 RX ADMIN — CEFAZOLIN 2 G: 1 INJECTION, POWDER, FOR SOLUTION INTRAMUSCULAR; INTRAVENOUS at 02:02

## 2023-01-04 RX ADMIN — APIXABAN 2.5 MG: 2.5 TABLET, FILM COATED ORAL at 09:49

## 2023-01-04 RX ADMIN — OXYCODONE HYDROCHLORIDE 2.5 MG: 5 TABLET ORAL at 16:42

## 2023-01-04 RX ADMIN — KETOROLAC TROMETHAMINE 15 MG: 30 INJECTION, SOLUTION INTRAMUSCULAR; INTRAVENOUS at 07:14

## 2023-01-04 RX ADMIN — DOXYCYCLINE HYCLATE 100 MG: 100 TABLET, COATED ORAL at 07:14

## 2023-01-04 RX ADMIN — SODIUM CHLORIDE, PRESERVATIVE FREE 10 ML: 5 INJECTION INTRAVENOUS at 21:55

## 2023-01-04 RX ADMIN — ACETAMINOPHEN 500 MG: 500 TABLET, FILM COATED ORAL at 17:59

## 2023-01-04 RX ADMIN — SODIUM CHLORIDE, PRESERVATIVE FREE 10 ML: 5 INJECTION INTRAVENOUS at 05:45

## 2023-01-04 NOTE — CONSULTS
[address] [facility] Adult  Hospitalist Group  Consult    Date of Service:  1/4/2023  Primary Care Provider: Toshia Jones MD  Source of information: The patient, Chart review, and Spouse/family member    Chief Complaint: No chief complaint on file. History of Presenting Illness:   Jo Ann Watson is a 65 y.o. male who is postop day 1 from left total hip arthroplasty. Patient has a history of chronic diastolic congestive heart failure, AAA, hypertension as well as atrial fibrillation. Patient post arthroplasty was noted to have decreased oxygen saturations on room air, was down to 77%. Of note preop he did have chest radiograph which was concerning for atelectasis with possibility of pneumonia, was treated with doxycycline. Due to his oxygen desaturations, hospitalist service was consulted for assistance in management  At the moment of the initial interview the wife is at the bedside and provided further history. Patient denies dyspnea however does endorse fatigue.   He denied chest pain, fever, chills, chest pressure, dizziness, lightheadedness            REVIEW OF SYSTEMS:  Constitutional: positive for cavity intolerance, otherwise negative  Eyes: negative  Ears, Nose, Mouth, Throat, and Face: negative  Respiratory: negative for cough, sputum, pleurisy/chest pain, or wheezing  Cardiovascular: negative for chest pain, chest pressure/discomfort, palpitations  Gastrointestinal: negative  Genitourinary:negative  Integument/Breast: negative  Hematologic/Lymphatic: negative  Musculoskeletal:negative  Neurological: negative  Behavioral/Psychiatric: negative     Past Medical History:   Diagnosis Date    Abdominal aortic aneurysm (AAA) without rupture     Adverse effect of anesthesia 2020    confusion and memory loss after anesthesia    Arrhythmia     AFIB    Arthropathy     Basal cell carcinoma     LOW BACK, HEAD, NECK    Cerebrovascular disease     Chronic heart failure with preserved ejection fraction ([facility] [address]     Dyslipidemia     Erectile dysfunction     Essential hypertension     GERD (gastroesophageal reflux disease)     H/O maze procedure     Heart failure ([facility] [address]     CHF    Hypertension     Mild intermittent asthma     Osteoarthritis of right hip     Psychiatric disorder     DEPRESSION    S/P AVR (aortic valve replacement) and aortoplasty     Sleep apnea     CPAP USE    Stroke ([facility] [address] 2011    DURING SURGERY FOR AORTIC VALVE      Past Surgical History:   Procedure Laterality Date    HX COLONOSCOPY      HX HEART VALVE SURGERY  2011    AORTIC VALVE    HX HEENT Bilateral     CATARACTS    HX HIP REPLACEMENT Right     4/2014 and 5/20/20    HX OTHER SURGICAL  2022    Alzheimer's    HX TONSIL AND ADENOIDECTOMY      ND UNLISTED PROCEDURE CARDIAC SURGERY  2011    ND UNLISTED PROCEDURE VASCULAR SURGERY  2021    peripheral left leg     Prior to Admission medications    Medication Sig Start Date End Date Taking? Authorizing Provider   apixaban (Eliquis) 2.5 mg tablet Take 1 Tablet by mouth two (2) times a day. Indications: deep vein thrombosis prevention 1/3/23  Yes Fariha Donahue PA-C   oxyCODONE IR (ROXICODONE) 5 mg immediate release tablet Take 0.5-1 Tablets by mouth every four (4) hours as needed for Pain for up to 7 days. Max Daily Amount: 30 mg. 1/3/23 1/10/23 Yes Fariha Donahue PA-C   senna-docusate (PERICOLACE) 8.6-50 mg per tablet Take 1 Tablet by mouth two (2) times daily as needed for Constipation. 1/3/23  Yes Richard Donahue PA-C   acetaminophen (TYLENOL) 500 mg tablet Take 1 Tablet by mouth every four (4) hours. 1/3/23  Yes Fariha Donahue PA-C   torsemide (DEMADEX) 100 mg tablet Take 100 mg by mouth daily. 10/6/22  Yes Provider, Historical   donepeziL (ARICEPT) 5 mg tablet Take 5 mg by mouth nightly. 6/15/22  Yes Provider, Historical   escitalopram oxalate (LEXAPRO) 5 mg tablet Take 5 mg by mouth daily.  9/19/22  Yes Provider, Historical   cetirizine (ZYRTEC) 10 mg tablet Take 10 mg by mouth nightly. 6/4/20  Yes Provider, Historical   cholecalciferol (VITAMIN D3) (1000 Units /25 mcg) tablet Take 2,000 Units by mouth daily. Yes Provider, Historical   metoprolol succinate (TOPROL-XL) 50 mg XL tablet Take 50 mg by mouth daily. 6/1/20  Yes Provider, Historical   simvastatin (ZOCOR) 40 mg tablet Take 40 mg by mouth nightly. 3/4/20  Yes Provider, Historical   cyanocobalamin (VITAMIN B12) 1,000 mcg/mL injection 1,000 mcg by IntraMUSCular route every month. HAS ALREADY HAD IN DEC. 2022 6/15/22   Provider, Historical   apixaban (ELIQUIS) 5 mg tablet Take 5 mg by mouth two (2) times a day. 11/25/19   Provider, Historical     No Known Allergies   Family History   Problem Relation Age of Onset    Heart Disease Mother     Kidney Disease Father     Diabetes Sister     Diabetes Brother     Diabetes Brother     Heart Failure Other     Anesth Problems Neg Hx       Social History:  reports that he has never smoked. He has never used smokeless tobacco. He reports that he does not drink alcohol and does not use drugs. Social Determinants of Health     Tobacco Use: Low Risk     Smoking Tobacco Use: Never    Smokeless Tobacco Use: Never    Passive Exposure: Not on file   Alcohol Use: Not on file   Financial Resource Strain: Not on file   Food Insecurity: Not on file   Transportation Needs: Not on file   Physical Activity: Not on file   Stress: Not on file   Social Connections: Not on file   Intimate Partner Violence: Not on file   Depression: Not on file   Housing Stability: Not on file        Family and social history were personally reviewed, all pertinent and relevant details are outlined as above.     Objective:   Visit Vitals  /65   Pulse 61   Temp 99 °F (37.2 °C)   Resp 16   Ht 5' 11\" (1.803 m)   Wt 113.4 kg (250 lb)   SpO2 96%   BMI 34.87 kg/m²    O2 Flow Rate (L/min): 2 l/min O2 Device: None    PHYSICAL EXAM:   General: Alert x oriented x 3, awake, no acute distress, resting in bed, pleasant male, appears to be stated age  [de-identified]: PEERL, EOMI, moist mucus membranes  Neck: Supple, no JVD,   Chest: Clear to auscultation bilaterally   CVS: RRR, S1 S2 heard, no murmurs/rubs/gallops  Abd: Soft, non-tender, non-distended, +bowel sounds   Ext: No clubbing, no cyanosis, +1 edema BLE  Neuro/Psych: Pleasant mood and affect, CN 2-12 grossly intact, sensory grossly within normal limit,   Cap refill: Brisk, less than 3 seconds  Pulses: 2+, symmetric in all extremities  Skin: Warm, dry, without rashes or lesions    Data Review: All diagnostic labs and studies have been reviewed. Abnormal Labs Reviewed   METABOLIC PANEL, BASIC - Abnormal; Notable for the following components:       Result Value    Potassium 3.3 (*)     Glucose 111 (*)     BUN 32 (*)     Creatinine 1.48 (*)     BUN/Creatinine ratio 22 (*)     eGFR 48 (*)     All other components within normal limits   HEMOGLOBIN - Abnormal; Notable for the following components:    HGB 11.2 (*)     All other components within normal limits       All Micro Results       None            IMAGING:   XR CHEST PORT   Final Result   Low lung volumes with mild bibasilar atelectasis--difficulty   excluding mild interstitial pulmonary edema. NC XR TECHNOLOGIST SERVICE    (Results Pending)   XR HIP LT DURING OR PROC    (Results Pending)        ECG/ECHO:  No results found for this or any previous visit. Assessment:   Given the patient's current clinical presentation, there is a high level of concern for decompensation if discharged from the emergency department. Complex decision making was performed, which includes reviewing the patient's available past medical records, laboratory results, and imaging studies. Active Problems:    Primary osteoarthritis of left hip (1/3/2023)        Plan:     Dyspnea/acute hypoxic respiratory failure due to exacerbation of chronic diastolic congestive heart failure  Saturations were down to 77 on room air.   When I went in and saw him his saturations were mainly in the 90s however did drop down to 88% on room air. I did do a point-of-care echo including PLAX,PSAX, A4C, SX views, appears to have normal wall motion. IVC was leadpipe fat. Point-of-care ultrasound of the lungs with diffuse B-lines however no hepatitization. Radiograph was done with low lung volumes and mild atelectasis, difficulty excluding mild interstitial pulmonary edema. On my read I did see fluid in the minor fissure consistent with fluid overload. Had not received any torsemide for the past couple of days as well as received IV fluids  Will switch diet to low-sodium  His blood pressure is stable, 109/65 however had been down to the 90s over 50s. We will restart his torsemide at a much lower dose. If blood pressure does not tolerate 20 mg of torsemide will give albumin and continue diuresis  Continuing metoprolol  Potassium was mildly low today, repleting  We will check a BNP  No chest pain or chest pressure however if we are checking blood anyway we will also check a troponin  I was not able to appreciate any pneumonia on the radiograph however agree with continuing doxycycline to complete a course of treatment of previous diagnosis of community-acquired pneumonia    Paroxysmal atrial fibrillation  Rate currently regular. His wife tells me that he has mostly been in sinus rhythm however most recently in atrial flutter  We will continue with metoprolol as well as apixaban      Left AYDE  Pain currently controlled  Postop day 1  Management per primary team    The rest per primary team    Thank you for the opportunity to participate in the care of this patient. Hospitalist service will continue to follow with you              Signed By: Carlso Ortiz NP     January 4, 2023         Please note that this dictation may have been completed with Reggie Hernandez, the [employer] voice recognition software.   Quite often unanticipated grammatical, syntax, homophones, and other interpretive errors are inadvertently transcribed by the computer software. Please disregard these errors. Please excuse any errors that have escaped final proofreading.

## 2023-01-04 NOTE — PROGRESS NOTES
Medicare Outpatient Observation Notice (MOON)/ provided to patient/representative with verbal explanation of the notice. Time allotted for questions regarding the notice. Patient /representative provided a completed copy of the MOON/ notice. Copy placed on bedside chart.

## 2023-01-04 NOTE — OP NOTES
[address] [facility]  OPERATIVE REPORT    Name:  Shena Leblanc  MR#:  [id number]  :  1945  ACCOUNT #:  [de-identified]  DATE OF SERVICE:  2023    PREOPERATIVE DIAGNOSIS:  Osteoarthritis, left hip. POSTOPERATIVE DIAGNOSIS:  Osteoarthritis, left hip. PROCEDURE PERFORMED:  Left total hip arthroplasty. SURGEON:  Jostin Landon MD    ASSISTANT:  Kell Mckeon PA-C    ANESTHESIA:  Spinal with sedation. COMPLICATIONS:  None. SPECIMENS REMOVED:  None. IMPLANTS:  DonJoy 56-mm Empowr acetabular shell with two cancellous screws and 14-mm inside diameter neutral polyethylene liner, size 13 standard offset TaperFill femoral stem with 40 mm +4 ceramic femoral head. ESTIMATED BLOOD LOSS:  300 mL. INDICATIONS:  The patient is a 65-year-old gentleman with progressive left hip and groin pain due to severe osteoarthritis of left hip. Symptoms have progressed despite comprehensive conservative treatment, and he presents for left total hip replacement. Risks, benefits, and alternatives of procedure were reviewed with him in detail and he desires to proceed. PROCEDURE:  The patient was taken to the operating room where the anesthesia team placed a spinal.  Preoperative IV antibiotics were administered. He was positioned supine on the [facility] [place] fracture table with both feet in traction boots, hips at neutral position. Left hip and thigh were prepped and draped in usual sterile fashion. Through a longitudinal skin incision centered over the tensor fascia joel muscle, the tensor fascia was incised in line with the muscle fibers and the skin incision. Fascia was retracted medially and tensor fascia muscle was retracted laterally. Ascending branches of the lateral femoral circumflex vessels were identified, ligated, and divided. Deeper fascia was incised and pericapsular fat was excised.   The reflected head of the rectus was elevated a little bit off the rim of the acetabulum anterolaterally. Capsulotomy was performed parallel to the femoral neck. Medial and lateral flaps were elevated off the rim of the acetabulum proximally. Distally, lateral limb was taken back into the shoulder of the proximal femur, and medially towards the lesser trochanter. Intracapsular retractors were placed. Femoral neck osteotomy was made and femoral head was removed with a corkscrew. Acetabular retractors were placed. Anterior capsulotomy was extended. Remaining noncalcified acetabular labrum was excised. Acetabulum was reamed with hemispherical reamers up to 55 mm before impacting a 56-mm Empowr acetabular shell. Intrinsic stability was satisfactory but was augmented with two cancellous screws. Acetabular component position was confirmed with AP fluoroscopic image of the pelvis. 40-mm neutral liner was impacted. The hip was progressively extended, abducted, and externally rotated as the anterolateral capsule was released of the femur, back to the inner aspect of the posterolateral greater trochanter. Femur was prepared with TaperFill broaches up to size 13 before achieving rotational stability. Calcar was planed. Based on native anatomy, hip was reduced with a standard offset neck trial.  40 +4 head trial produced satisfactory leg length on AP fluoroscopic image of the pelvis, and it was stable just past 90 degrees of external rotation. The trials were removed from the femur and the real 13 stem was impacted as well as a 40 mm +4 ceramic femoral head. Hip was reduced and final AP pelvis and AP hip fluoroscopic images confirmed component position as well as leg length and offset. Wound was copiously irrigated by pulse lavage. Periarticular soft tissues were injected with solution containing 0.5% ropivacaine with epinephrine as well as clonidine and Toradol. Anterior capsule was repaired with heavy interrupted Vicryl sutures.   The tensor fascia was closed with a combination of heavy Vicryl sutures and a running #2 Stratafix suture. Skin and subcutaneous layers were closed in layered fashion with Vicryl and a running Monocryl subcuticular stitch. The wound was dressed with Dermabond and Aquacel occlusive dressing. The patient's bladder was decompressed with straight catheterization before he was transported to postanesthesia care unit in stable condition. All counts were correct at the end of the procedure. The physician assistant was critical throughout the procedure to assist with patient positioning, retraction, and closure.   There were no [employer] residents or fellows available to Abdias Browne MD      [id number]  D:  01/03/2023 22:33  T:  01/04/2023 2:52  JOB #:  [id number]  CC:  Diana Fleming MD

## 2023-01-04 NOTE — PROGRESS NOTES
Problem: Self Care Deficits Care Plan (Adult)  Goal: *Acute Goals and Plan of Care (Insert Text)  Description: FUNCTIONAL STATUS PRIOR TO ADMISSION: Patient was independent and active without use of DME.    HOME SUPPORT: The patient lived alone with his wife to provide assistance. She assisted with LB dressing PRN. He has hx of prior hip sx and owns AE/DME. Occupational Therapy Goals  Initiated 1/4/2023  1. Patient will perform lower body ADLs with AE supervision/set-up within 4 day(s). 2.  Patient will perform upper body ADLs standing 5 mins without fatigue or LOB with supervision/set-up within 4 day(s). 3.  Patient will perform toilet transfer with supervision/set-up within 4 day(s). 4.  Patient will perform all aspects of toileting with supervision/set-up within 4 day(s). 5.  Patient will participate in upper extremity therapeutic exercise/activities with supervision/set-up for 10 minutes within 4 day(s). 6.  Patient will utilize energy conservation techniques during functional activities without cues within 4 day(s). Outcome: Not Met     OCCUPATIONAL THERAPY EVALUATION  Patient: Lucille Dunn (20 y.o. male)  Date: 1/4/2023  Primary Diagnosis: Primary osteoarthritis of left hip [M16.12]  Procedure(s) (LRB):  LEFT TOTAL HIP ARTHROPLASTY ANTERIOR APPROACH (Left) 1 Day Post-Op   Precautions:  Fall, WBAT    ASSESSMENT  Based on the objective data described below, the patient presents with impairments in bed mobility, standing balance, generalized weakness, functional transfers, household ambulation, activity tolerance, and endurance. He completed all transfers and mobility with CGA overall and use of RW. Throughout session he declined dizziness and shortness of breath, however was hypotensive initially (95/56) and sats dropped as low as 77% on room air after LB dressing. Educated on PLB technique and recovered to 92% at max with 3-5 minute rest breaks.  After activity, BP increased to 111/63 and oxygen remained at 92%. He completed all dressing with overall CGA/SBA while demonstrating adequate use of adaptive equipment. Patient is a high fall risk given recent sx, vitals, and lack of insight into deficits. OT recs HHOT with 24/7 family supervision at discharge. Acute OT will continue to follow to maximize independence. Current Level of Function Impacting Discharge (ADLs/self-care): CGA - min A    Functional Outcome Measure: The patient scored 70/100 on the barthel indexc outcome measure which is indicative of partially independent with self care tasks. Other factors to consider for discharge: 24/7 assistance from wife, motivated, Aox4, PLOF     Patient will benefit from skilled therapy intervention to address the above noted impairments. PLAN :  Recommendations and Planned Interventions: self care training, functional mobility training, therapeutic exercise, therapeutic activities, patient education, home safety training, and family training/education    Frequency/Duration: Patient will be followed by occupational therapy 5 times a week to address goals. Recommendation for discharge: (in order for the patient to meet his/her long term goals)  Occupational therapy at least 2 days/week in the home     This discharge recommendation:  Has been made in collaboration with the attending provider and/or case management    IF patient discharges home will need the following DME: patient owns DME required for discharge       SUBJECTIVE:   Patient stated I want to get back to [employer].     OBJECTIVE DATA SUMMARY:   HISTORY:   Past Medical History:   Diagnosis Date    Abdominal aortic aneurysm (AAA) without rupture     Adverse effect of anesthesia 2020    confusion and memory loss after anesthesia    Arrhythmia     AFIB    Arthropathy     Basal cell carcinoma     LOW BACK, HEAD, NECK    Cerebrovascular disease     Chronic heart failure with preserved ejection fraction (HCC)     Dyslipidemia     Erectile dysfunction     Essential hypertension     GERD (gastroesophageal reflux disease)     H/O maze procedure     Heart failure (HCC)     CHF    Hypertension     Mild intermittent asthma     Osteoarthritis of right hip     Psychiatric disorder     DEPRESSION    S/P AVR (aortic valve replacement) and aortoplasty     Sleep apnea     CPAP USE    Stroke ([facility] [address] 2011    DURING SURGERY FOR AORTIC VALVE     Past Surgical History:   Procedure Laterality Date    HX COLONOSCOPY      HX HEART VALVE SURGERY  2011    AORTIC VALVE    HX HEENT Bilateral     CATARACTS    HX HIP REPLACEMENT Right     4/2014 and 5/20/20    HX OTHER SURGICAL  2022    Alzheimer's    HX TONSIL AND ADENOIDECTOMY      [place] UNLISTED PROCEDURE CARDIAC SURGERY  2011    [place] UNLISTED PROCEDURE VASCULAR SURGERY  2021    peripheral left leg       Expanded or extensive additional review of patient history:     Home Situation  Home Environment: Private residence  # Steps to Enter: 0  Rails to Enter: No  One/Two Story Residence: One story  Living Alone: No  Support Systems: Spouse/Significant Other  Patient Expects to be Discharged to[de-identified] Home with home health  Current DME Used/Available at Home: Cane, straight, Grab bars, Raised toilet seat, Walker, rolling, Blood pressure cuff, CPAP  Tub or Shower Type: Shower    Hand dominance: Right    EXAMINATION OF PERFORMANCE DEFICITS:  Cognitive/Behavioral Status:  Neurologic State: Alert  Orientation Level: Oriented X4  Cognition: Appropriate decision making; Follows commands  Perception: Appears intact  Perseveration: No perseveration noted  Safety/Judgement: Awareness of environment    Skin: bandage C/D/I    Edema: none observed    Hearing:   Auditory  Auditory Impairment: Hard of hearing, bilateral  Hearing Aids/Status: Bilateral, Functioning    Vision/Perceptual:    Diplopia: No    Acuity: Able to read employee name badge without difficulty    Corrective Lenses: Glasses    Range of Motion:  AROM: Generally decreased, functional (except L hip)  PROM: Generally decreased, functional    Strength:  Strength: Generally decreased, functional (except L hip)    Coordination:  Coordination: Generally decreased, functional  Fine Motor Skills-Upper: Left Intact; Right Intact    Gross Motor Skills-Upper: Left Intact; Right Intact    Tone & Sensation:  Tone: Normal  Sensation: Intact    Balance:  Sitting: Intact  Standing: Intact; With support  Standing - Static: Good;Constant support  Standing - Dynamic : Good;Constant support    Functional Mobility and Transfers for ADLs:  Bed Mobility:  Rolling: Contact guard assistance  Supine to Sit: Contact guard assistance  Sit to Supine:  (did not return to supine)  Scooting: Stand-by assistance    Transfers:  Sit to Stand: Contact guard assistance  Stand to Sit: Contact guard assistance  Bed to Chair: Contact guard assistance  Bathroom Mobility: Contact guard assistance  Toilet Transfer : Contact guard assistance  Assistive Device : Walker, rolling;Gait Belt    ADL Assessment:  Feeding: Stand-by assistance    Oral Facial Hygiene/Grooming: Stand-by assistance    Bathing: Contact guard assistance    Type of Bath: Partial    Upper Body Dressing: Independent;Setup    Lower Body Dressing: Contact guard assistance    Toileting: Contact guard assistance    ADL Intervention and task modifications:  Feeding  Container Management: Standing-by assistance  Food to Mouth: Independent  Drink to Mouth: Independent    Type of Bath: Partial    Upper [address]: Minimum  assistance  Pullover Shirt: Independent; Set-up    Lower Body Dressing Assistance  Pants With Button/Zipper: Contact guard assistance  Socks: Minimum assistance  Slip on Shoes with Back: Contact guard assistance  Leg Crossed Method Used: No  Position Performed: Seated in chair;Standing  Cues: Verbal cues provided  Adaptive Equipment Used: Reacher;Sock aid    Toileting  Toileting Assistance: Contact guard assistance  Bladder Hygiene: Contact guard assistance  Clothing Management: Stand-by assistance    Cognitive Retraining  Problem Solving: General alternative solution  Following Commands: Follows one step commands/directions  Safety/Judgement: Awareness of environment  Cues: Verbal cues provided    Precautions: Patient recalled and demonstrated avoiding extreme planes of movement with Left LE during ADLs and functional mobility with minimal cues. Bathing: Patient instructed when bathing to not submerge wound in water, stand to shower or sponge bathe, cover wound with plastic and tape to ensure no water reaches bandage/wound without cues. Patient indicated understanding. Dressing joint: Patient instructed and demonstrated understanding to don/doff Left LE first/last with minimal cues. Patient instructed and demonstrated to don all clothing while sitting prior to standing, doff all clothing to knees while standing, then sit to doff clothing off from knees to feet to facilitate fall prevention, pain management, and energy conservation with Minimum assistance. Dressing joint reach exercise: To increase independence with lower body dressing, patient instructed and demonstrated to reach down Left LE in a seated position slowly to prevent tearing/shearing until slight pull is felt, hold at end range for 10 seconds, then return to starting upright position with Minimum assistance. Patient instructed to complete three sets of three repetitions each daily. Home safety: Patient instructed on home modifications and safety (raise height of ADL objects, appropriate height of chair surfaces, recliner safety, change of floor surfaces, clear pathways) to increase independence and fall prevention. Patient indicated understanding. Standing: Patient instructed and demonstrated to walk up to sink/countertop/surfaces, step into walker to increase safety of joint and fall prevention with Contact guard assistance.  Instructed to apply concept of hip contraindications to ADLs within the home (no extreme reaching across body to Left side, square off while using objects, slide objects along surfaces). Patient instructed to increase amount of time standing, observe standing position during ADLs to increase even weight bearing through bilateral LEs to increase independence with ADLs. Goal to be reached 30 days post - op, per orthopedic surgeon or per PT. Patient indicated understanding. Tub transfer: Patient instructed regarding when it is safe to begin transfer into tub (complete stairs with PT, advance exercises with PT high enough to clear tub height). Patient instructed to use the same technique as used with stairs when entering and exiting tub (\"up with the non-surgical, down with the surgical leg\"). Patient indicated understanding  Functional Measure:    Barthel Index:  Bathin  Bladder: 10  Bowels: 10  Groomin  Dressin  Feeding: 10  Mobility: 10  Stairs: 5  Toilet Use: 5  Transfer (Bed to Chair and Back): 10  Total: 70/100     The Barthel ADL Index: Guidelines  1. The index should be used as a record of what a patient does, not as a record of what a patient could do. 2. The main aim is to establish degree of independence from any help, physical or verbal, however minor and for whatever reason. 3. The need for supervision renders the patient not independent. 4. A patient's performance should be established using the best available evidence. Asking the patient, friends/relatives and nurses are the usual sources, but direct observation and common sense are also important. However direct testing is not needed. 5. Usually the patient's performance over the preceding 24-48 hours is important, but occasionally longer periods will be relevant. 6. Middle categories imply that the patient supplies over 50 per cent of the effort. 7. Use of aids to be independent is allowed.     Score Interpretation (from [facility] 83)    Independent   60-79 Minimally independent   40-59 Partially dependent   20-39 Very dependent   <20 Totally dependent     -Summer Gallagher, Barthel, D.W. (3816). Functional evaluation: the Barthel Index. [facility] ([address] (1997). The Barthel activities of daily living index: self-reporting versus actual performance in the old (> or = 75 years). Journal of [address] [place] [address] 45(7), 14 [facility], DERIK, Emerson Gomez., Nancy Segovia. (1999). Measuring the change in disability after inpatient rehabilitation; comparison of the responsiveness of the Barthel Index and Functional [place] Measure. Journal of Neurology, Neurosurgery, and Psychiatry, 66(4), 823-176. FRANKIE Trevizo, NETTA Mejia, & Norman Toney MNIECY. (2004) Assessment of post-stroke quality of life in cost-effectiveness studies: The usefulness of the Barthel Index and the EuroQoL-5D. Quality of Life Research, 15, 797-23     Occupational Therapy Evaluation Charge Determination   History Examination Decision-Making   LOW Complexity : Brief history review  LOW Complexity : 1-3 performance deficits relating to physical, cognitive , or psychosocial skils that result in activity limitations and / or participation restrictions  LOW Complexity : No comorbidities that affect functional and no verbal or physical assistance needed to complete eval tasks       Based on the above components, the patient evaluation is determined to be of the following complexity level: LOW   Pain Ratin/10 throughout session     Activity Tolerance:   Fair, requires rest breaks, observed SOB with activity, and signs and symptoms of orthostatic hypotension    After treatment patient left in no apparent distress:    Sitting in chair, Call bell within reach, and Caregiver / family present    COMMUNICATION/EDUCATION:   The patients plan of care was discussed with: Physical therapist, Registered nurse, and NP .      Home safety education was provided and the patient/caregiver indicated understanding. This patients plan of care is appropriate for delegation to JAZMYN.     Thank you for this referral.  Jefe Mcnamara, OT  Time Calculation: 60 mins

## 2023-01-04 NOTE — PROGRESS NOTES
Orthopaedics Daily Progress Note                            Date of Surgery:  1/3/2023      Patient: Doris Mcnair   YOB: 1945  Age: 65 y.o. SUBJECTIVE:   1 Day Post-Op following LEFT TOTAL HIP ARTHROPLASTY ANTERIOR APPROACH. The patient's post operative pain is controlled. No CP/SOB. No N/V. The patient's mobility will be evaluated today during PT sessions. OBJECTIVE:     Vital Signs:    Visit Vitals  /77   Pulse 60   Temp 97.2 °F (36.2 °C)   Resp 16   Ht 5' 11\" (1.803 m)   Wt 250 lb (113.4 kg)   SpO2 90%   BMI 34.87 kg/m²       Physical Exam:  General: A&Ox3. The patient is cooperative, and in no acute distress. Respiratory: Respirations are unlabored. Surgical site(s): dressing clean, dry  Musculoskeletal: Calves are soft, supple, and non-tender upon palpation. Motor 5/5. Neurological:  Neurovascularly intact with good dorsi and plantar flexion. Pulses symmetrical.    Laboratory Values:             Recent Results (from the past 12 hour(s))   METABOLIC PANEL, BASIC    Collection Time: 01/04/23  2:03 AM   Result Value Ref Range    Sodium 139 136 - 145 mmol/L    Potassium 3.3 (L) 3.5 - 5.1 mmol/L    Chloride 103 97 - 108 mmol/L    CO2 30 21 - 32 mmol/L    Anion gap 6 5 - 15 mmol/L    Glucose 111 (H) 65 - 100 mg/dL    BUN 32 (H) 6 - 20 MG/DL    Creatinine 1.48 (H) 0.70 - 1.30 MG/DL    BUN/Creatinine ratio 22 (H) 12 - 20      eGFR 48 (L) >60 ml/min/1.73m2    Calcium 8.5 8.5 - 10.1 MG/DL   HEMOGLOBIN    Collection Time: 01/04/23  2:03 AM   Result Value Ref Range    HGB 11.2 (L) 12.1 - 17.0 g/dL         PLAN:     S/P LEFT TOTAL HIP ARTHROPLASTY ANTERIOR APPROACH -Continue WBAT. -Mobilize and continue with PT/OT until discharged  Cr elevated - d/c toradol , continue fluids, check bmp tomorrow if in house      Hemodynamics Hgb today is 11.2. Acute blood loss anemia as expected. Patient asymptomatic. Continue to monitor.      Wound Monitor postop dressing; no postop dressing changes necessary. Reinforce PRN. Post Operative Pain Pain Control: stable, mild-to-moderate joint symptoms intermittently, reasonably well controlled by current meds. DVT Prophylaxis Continue with SCD'S, Ankle Pump Exercises. ASA 81mg BID     Discharge Disposition Discharge plan: Home with HHPT pending PT clearance .        Signed By: Tonny Lee PA-C  January 4, 2023 8:09 AM

## 2023-01-04 NOTE — PROGRESS NOTES
Spiritual Care Assessment/Progress Note  [facility]      NAME: Romana Kilts      MRN: [id number]  AGE: 65 y.o. SEX: male  Religion Affiliation: Voodoo   Language: English     1/4/2023     Total Time (in minutes): 5     Spiritual Assessment begun in [facility] through conversation with:         [x]Patient        [x] Family    [] Friend(s)        Reason for Consult: [facility]     Spiritual beliefs: (Please include comment if needed)     [x] Identifies with a jacob tradition:  Voodoo       [x] Supported by a jacob community: Epiphany           [] Claims no spiritual orientation:           [] Seeking spiritual identity:                [] Adheres to an individual form of spirituality:           [] Not able to assess:                           Identified resources for coping:      [x] Prayer                               [x] Music                  [] Guided Imagery     [x] Family/friends                 [] Pet visits     [] Devotional reading                         [] Unknown     [] Other:                                               Interventions offered during this visit: (See comments for more details)    Patient Interventions: Affirmation of jacob, Communion [employer]), Initial/Spiritual assessment, patient floor, Prayer (actual), Prayer (assurance of)     Family/Friend(s):  Affirmation of jacob, Communion (Voodoo), Prayer (actual), Prayer (assurance of)     Plan of Care:     [x] Support spiritual and/or cultural needs    [] Support AMD and/or advance care planning process      [] Support grieving process   [] Coordinate Rites and/or Rituals    [] Coordination with community clergy   [] No spiritual needs identified at this time   [] Detailed Plan of Care below (See Comments)  [] Make referral to Music Therapy  [] Make referral to Pet Therapy     [] Make referral to Addiction services  [] Make referral to [facility]  [] Make referral to Spiritual Care Partner  [] No future visits requested        [] Contact Spiritual Care for further referrals     Comments: Mr. Sharon Alonso was in bed. His wife was with him. He declined communion today. Couple was pleased that Rambo Pedro would be here on Thurs and would appreciate a visit from him. Assured of prayers and let them know about the Mass schedule.     STEVE Briscoe, RN, ACSW, LCSW   Page:  [phone])

## 2023-01-04 NOTE — PROGRESS NOTES
Primary Nurse Zainab Perez RN and ANGELITA RN performed a dual skin assessment on this patient No impairment noted  David score is 19

## 2023-01-04 NOTE — PROGRESS NOTES
Problem: Mobility Impaired (Adult and Pediatric)  Goal: *Acute Goals and Plan of Care (Insert Text)  Description: FUNCTIONAL STATUS PRIOR TO ADMISSION: Patient was independent and active without use of DME.    HOME SUPPORT PRIOR TO ADMISSION: The patient lived with wife but did not require assist.    Physical Therapy Goals  Initiated 1/3/2023    1. Patient will move from supine to sit and sit to supine , scoot up and down, and roll side to side in bed with modified independence within 4 days. 2. Patient will perform sit to stand with modified independence within 4 days. 3. Patient will ambulate with modified independence for 150 feet with the least restrictive device within 4 days. 4. Patient will ascend/descend 4 stairs with cane and one handrail(s) with modified independence within 4 days. 5. Patient will perform post-AYDE home exercise program per protocol with independence within 4 days. 1/4/2023 1509 by Mitul Leblanc  Outcome: Progressing Towards Goal   PHYSICAL THERAPY TREATMENT  Patient: Nya Matute (51 y.o. male)  Date: 1/4/2023  Diagnosis: Primary osteoarthritis of left hip [M16.12] <principal problem not specified>  Procedure(s) (LRB):  LEFT TOTAL HIP ARTHROPLASTY ANTERIOR APPROACH (Left) 1 Day Post-Op  Precautions: Fall, WBAT  Chart, physical therapy assessment, plan of care and goals were reviewed. ASSESSMENT  Patient continues with skilled PT services and is progressing towards goals. Addressed bed mobility as that is an issue today (primarily due to slow motor processing). Practiced techniques several times and instead of giving specific cues, had patient \"do it like he usually does\". Practiced sit-stand-sit from side of bed x 4. ..did well. Ambulated 150 ft with RW and gait belt, gait steady. BP stable, but spO2 drops to 87% on room air, steady at 93% on 1L/min supplemental O2 via nasal cannula. Anticipate discharge after am PT session if medically stable.     Current Level of Function Impacting Discharge (mobility/balance): Contact guard-stand by assistance for all mobility. Other factors to consider for discharge: Slow motor processing/Motivated/A & O x 4/Supportive Family/Independent PLOF          PLAN :  Patient continues to benefit from skilled intervention to address the above impairments. Continue treatment per established plan of care. to address goals. Recommendation for discharge: (in order for the patient to meet his/her long term goals)  Physical therapy at least 2 days/week in the home AND ensure assist and/or supervision for safety with all ADLs and mobility. This discharge recommendation:  Has been made in collaboration with the attending provider and/or case management    IF patient discharges home will need the following DME: patient owns DME required for discharge       SUBJECTIVE:   Patient stated I am okay.     OBJECTIVE DATA SUMMARY:   Critical Behavior:  Neurologic State: Alert  Orientation Level: Oriented X4  Cognition: Appropriate decision making, Follows commands  Safety/Judgement: Awareness of environment  Functional Mobility Training:  Bed Mobility:  Rolling: Contact guard assistance  Supine to Sit: Contact guard assistance; Adaptive equipment; Additional time  Sit to Supine: Contact guard assistance; Adaptive equipment  Scooting: Stand-by assistance        Transfers:  Sit to Stand: Contact guard assistance;Stand-by assistance (Iinitially CGA, after practicing several times, stand by assistance)  Stand to Sit: Stand-by assistance        Bed to Chair: Contact guard assistance                    Balance:  Sitting: Intact  Standing: Intact; With support  Standing - Static: Good;Constant support  Standing - Dynamic : Good;Constant support  Ambulation/Gait Training:  Distance (ft): 150 Feet (ft)  Assistive Device: Walker, rolling;Gait belt (1L supplemental O2 via nasal cannula)  Ambulation - Level of Assistance: Contact guard assistance        Gait Abnormalities: Antalgic     Left Side Weight Bearing: As tolerated  Base of Support: Shift to right  Stance: Left decreased  Speed/Cleopatra: Slow  Step Length: Right shortened  Swing Pattern: Left asymmetrical     Interventions: Safety awareness training;Verbal cues            Activity Tolerance:   Good and desaturates with exertion and requires oxygen    After treatment patient left in no apparent distress:   Supine in bed, Call bell within reach, Caregiver / family present, Side rails x 3, and nurse notified. COMMUNICATION/COLLABORATION:   The patients plan of care was discussed with: Registered nurse, Physician, and Case management.      Tamy Rivera   Time Calculation: 35 mins

## 2023-01-04 NOTE — PROGRESS NOTES
Ortho NP Note    POD# 1  s/p LEFT TOTAL HIP ARTHROPLASTY ANTERIOR APPROACH   Pt seen in room with wife at bedside. Pt resting in chair. Reports that he feels \"fine\" at present however endorsed fatigue and poor activity tolerance when up and working with therapy this morning. Wife states that patient did complain of dizziness initially when getting OOB though patient does not recall. Patient denies SOB or dizziness at present. Per wife , she feels he is \"off\" and her perception of his work of breathing is that it is slightly more than baseline though she does note that his SIMS/work of breathing fluctuates significantly at home. She does take home oxygen sats daily and they typically are >90%. Has never required home oxygen but does use CPAP nightly. Patient reports he has been using IS and demonstrated proper use. Did wear CPAP overnight. Denies N/V. Ate breakfast.  + Void. Denies cough. No fever chills. No edema to UEs/LEs. VSS Afebrile. Visit Vitals  /63 (BP 1 Location: Right upper arm, BP Patient Position: Sitting)   Pulse 65   Temp 99 °F (37.2 °C)   Resp 16   Ht 5' 11\" (1.803 m)   Wt 113.4 kg (250 lb)   SpO2 (!) 77%   BMI 34.87 kg/m²       Voiding status: spontaneous void  Output (mL)  Urine Voided: 200 ml (01/04/23 0725)      Labs    Lab Results   Component Value Date/Time    HGB 11.2 (L) 01/04/2023 02:03 AM      Lab Results   Component Value Date/Time    INR 1.1 12/27/2022 11:38 AM      Lab Results   Component Value Date/Time    Sodium 139 01/04/2023 02:03 AM    Potassium 3.3 (L) 01/04/2023 02:03 AM    Chloride 103 01/04/2023 02:03 AM    CO2 30 01/04/2023 02:03 AM    Glucose 111 (H) 01/04/2023 02:03 AM    BUN 32 (H) 01/04/2023 02:03 AM    Creatinine 1.48 (H) 01/04/2023 02:03 AM    Calcium 8.5 01/04/2023 02:03 AM     Recent Glucose Results:   Lab Results   Component Value Date/Time     (H) 01/04/2023 02:03 AM         Alert and oriented x 3.  Appropriately conversant, speaking in phrase  Aquacel dressing c.d. I to left hip  Cryotherapy in place over incision  Calves soft and supple; No pain with passive stretch  Bilateral LEs warm, dry. 2+ DP pulses. No edema noted to bilateral LEs  Sensation and motor intact - PF/DF/EHL intact 5/5  SCDs for mechanical DVT proph while in bed     ASSESSMENT/PLAN: I have reviewed progress note and am in agreement with assessment and plan as documented by Ever CISNEROS. Interval updates as follows:      1) PT: BID WBAT in hospital.  Therapy to be continued at home with HH--CM involved to arrange  2) Anticoagulation: Eliquis 2.5 mg PO BID in post op period, continue half dose x 5 days post op prior to resuming home dose of Eliquis 5 mg PO BID . Encouraged ongoing mobilization, bed exercises. 3) Pain - Multimodal approach including cryotherapy, scheduled tylenol with PRN oxycodone  4) Hypoxia, requiring supplemental oxygen: Repeating CXR. Continue doxycycline for presumed PNA as dx'ed at urgent care. Hospitalist for co management--appreciate input. IS, wean NC as able. 5) Post operative anemia: Hgb on 12/7: 13.4. EBL: 300 mL. Hgb on POD 1: 11.2. No active bleeding noted on exam. Expected Acute blood loss post-op anemia  6)Symptomatic hypotension: Dizziness in AM with noted Bps to 90s/50s. Bolus. 7) CKD Stage 3A: Pre op Cr: 1.2. Discontinued toradol. Repeat AM BMP. 8) Paroxysmal Atrial Fibrillation: Continue Toprol XL daily. Anticoagulation as per above. Continue routine VS.   9) JULES: CPAP order set placed, at bedside. 10) Post op care: Continue bowel regimen, encouraged IS. Follow up in 3-4 weeks with Dr Gen Jones. Aquacel to remain in place x 7 days unless integrity is lost.     Pending therapy progress, hospitalist consult. Continues to require supplemental O2, not yet medically ready.      Wero Archer NP  Available via [employer]

## 2023-01-04 NOTE — PROGRESS NOTES
Bedside shift change report given to melina (oncoming nurse) by rae (offgoing nurse). Report included the following information SBAR, Kardex, Intake/Output, and MAR.

## 2023-01-04 NOTE — PROGRESS NOTES
Transition of Care: Plan for discharge home with wife and HH. AT [facility] has accepted patient. Patient owns rolling walker. Patient currently on 02, however does not use home 02. Family will transport patient home    The Plan for Transition of Care is related to the following treatment goals: home health, no preference    The Patient and/or patient representative  was provided with a choice of provider and agrees   with the discharge plan. [x] Yes [] No    Freedom of choice list was provided with basic dialogue that supports the patient's individualized plan of care/goals, treatment preferences and shares the quality data associated with the providers.  [x] Yes [] No      NAVID Richardson/STACEY

## 2023-01-04 NOTE — PROGRESS NOTES
Problem: Mobility Impaired (Adult and Pediatric)  Goal: *Acute Goals and Plan of Care (Insert Text)  Description: FUNCTIONAL STATUS PRIOR TO ADMISSION: Patient was independent and active without use of DME.    HOME SUPPORT PRIOR TO ADMISSION: The patient lived with wife but did not require assist.    Physical Therapy Goals  Initiated 1/3/2023    1. Patient will move from supine to sit and sit to supine , scoot up and down, and roll side to side in bed with modified independence within 4 days. 2. Patient will perform sit to stand with modified independence within 4 days. 3. Patient will ambulate with modified independence for 150 feet with the least restrictive device within 4 days. 4. Patient will ascend/descend 4 stairs with cane and one handrail(s) with modified independence within 4 days. 5. Patient will perform post-AYDE home exercise program per protocol with independence within 4 days. Outcome: Progressing Towards Goal   PHYSICAL THERAPY TREATMENT  Patient: Jo Ann Watson (75 y.o. male)  Date: 1/4/2023  Diagnosis: Primary osteoarthritis of left hip [M16.12] <principal problem not specified>  Procedure(s) (LRB):  LEFT TOTAL HIP ARTHROPLASTY ANTERIOR APPROACH (Left) 1 Day Post-Op  Precautions: Fall, WBAT  Chart, physical therapy assessment, plan of care and goals were reviewed. ASSESSMENT  Patient continues with skilled PT services and is progressing towards goals. Patient was seen by OT and she reported that although he moved well, his spO2 on room air (while dressing) went down to 77% and BP was low. Patient presents sitting up in chair, being assessed by nurse. Fluid bolus ordered by NP and started. At rest on room air, spO2 was 88% and 89%, so nurse applied 1L supplemental O2 via nasal cannula and spO2 increased to 93%. Did not attempt ambulation this am, will wait for bolus to finish. Patient needed to use urinal, so did perform sit-stand, stood at [address] [place] [address], used urinal, stand-sit.  Performed a few reps of exercises but required lots of cueing. Current Level of Function Impacting Discharge (mobility/balance): Contact guard assistance and use of RW for transfers    Other factors to consider for discharge: Motivated/A & O x 3/Supportive Family/Independent PLOF/Difficulty processing instructions         PLAN :  Patient continues to benefit from skilled intervention to address the above impairments. Continue treatment per established plan of care. to address goals. Recommendation for discharge: (in order for the patient to meet his/her long term goals)  Physical therapy at least 2 days/week in the home AND ensure assist and/or supervision for safety with all ADLs and mobility. This discharge recommendation:  Has been made in collaboration with the attending provider and/or case management    IF patient discharges home will need the following DME: patient owns DME required for discharge       SUBJECTIVE:   Patient stated I need to use the bathroom.     OBJECTIVE DATA SUMMARY:   Critical Behavior:  Neurologic State: Alert  Orientation Level: Oriented X4  Cognition: Appropriate decision making, Follows commands  Safety/Judgement: Awareness of environment  Functional Mobility Training:  Bed Mobility:  Rolling: Contact guard assistance  Supine to Sit: Contact guard assistance  Sit to Supine:  (did not return to supine)  Scooting: Stand-by assistance        Transfers:  Sit to Stand: Contact guard assistance  Stand to Sit: Contact guard assistance        Bed to Chair: Contact guard assistance                    Balance:  Sitting: Intact  Standing: Intact; With support  Standing - Static: Good;Constant support  Standing - Dynamic : Good;Constant support  Ambulation/Gait Training:             Therapeutic Exercises:    Ankle Pumps  Glute Sets  Quad Sets  Heel Slides  X 10 each every hour     Pain Ratin/10    Activity Tolerance:   desaturates with exertion and requires oxygen and signs and symptoms of orthostatic hypotension    After treatment patient left in no apparent distress:   Sitting in chair, Call bell within reach, Caregiver / family present, and nurse in room at end of session    COMMUNICATION/COLLABORATION:   The patients plan of care was discussed with: Occupational therapist, Registered nurse, and Physician.      Tmay Rivera   Time Calculation: 17 mins

## 2023-01-05 PROBLEM — Z96.642 S/P TOTAL LEFT HIP ARTHROPLASTY: Status: ACTIVE | Noted: 2023-01-05

## 2023-01-05 LAB
ANION GAP SERPL CALC-SCNC: 5 MMOL/L (ref 5–15)
BUN SERPL-MCNC: 33 MG/DL (ref 6–20)
BUN/CREAT SERPL: 27 (ref 12–20)
CALCIUM SERPL-MCNC: 8.7 MG/DL (ref 8.5–10.1)
CHLORIDE SERPL-SCNC: 105 MMOL/L (ref 97–108)
CO2 SERPL-SCNC: 28 MMOL/L (ref 21–32)
COMMENT, HOLDF: NORMAL
CREAT SERPL-MCNC: 1.22 MG/DL (ref 0.7–1.3)
GLUCOSE SERPL-MCNC: 103 MG/DL (ref 65–100)
POTASSIUM SERPL-SCNC: 3.9 MMOL/L (ref 3.5–5.1)
SAMPLES BEING HELD,HOLD: NORMAL
SODIUM SERPL-SCNC: 138 MMOL/L (ref 136–145)

## 2023-01-05 PROCEDURE — 94660 CPAP INITIATION&MGMT: CPT

## 2023-01-05 PROCEDURE — 97530 THERAPEUTIC ACTIVITIES: CPT

## 2023-01-05 PROCEDURE — 77010033678 HC OXYGEN DAILY

## 2023-01-05 PROCEDURE — 36415 COLL VENOUS BLD VENIPUNCTURE: CPT

## 2023-01-05 PROCEDURE — 74011000250 HC RX REV CODE- 250: Performed by: PHYSICIAN ASSISTANT

## 2023-01-05 PROCEDURE — 97116 GAIT TRAINING THERAPY: CPT

## 2023-01-05 PROCEDURE — 80048 BASIC METABOLIC PNL TOTAL CA: CPT

## 2023-01-05 PROCEDURE — 65270000029 HC RM PRIVATE

## 2023-01-05 PROCEDURE — G0378 HOSPITAL OBSERVATION PER HR: HCPCS

## 2023-01-05 PROCEDURE — 74011250637 HC RX REV CODE- 250/637: Performed by: NURSE PRACTITIONER

## 2023-01-05 PROCEDURE — 97535 SELF CARE MNGMENT TRAINING: CPT

## 2023-01-05 PROCEDURE — 74011250637 HC RX REV CODE- 250/637

## 2023-01-05 PROCEDURE — 5A09357 ASSISTANCE WITH RESPIRATORY VENTILATION, LESS THAN 24 CONSECUTIVE HOURS, CONTINUOUS POSITIVE AIRWAY PRESSURE: ICD-10-PCS | Performed by: HOSPITALIST

## 2023-01-05 PROCEDURE — 74011250637 HC RX REV CODE- 250/637: Performed by: PHYSICIAN ASSISTANT

## 2023-01-05 RX ORDER — TORSEMIDE 100 MG/1
100 TABLET ORAL DAILY
Status: DISCONTINUED | OUTPATIENT
Start: 2023-01-05 | End: 2023-01-06 | Stop reason: HOSPADM

## 2023-01-05 RX ADMIN — DONEPEZIL HYDROCHLORIDE 5 MG: 5 TABLET, FILM COATED ORAL at 22:33

## 2023-01-05 RX ADMIN — TORSEMIDE 100 MG: 100 TABLET ORAL at 12:07

## 2023-01-05 RX ADMIN — ATORVASTATIN CALCIUM 20 MG: 40 TABLET, FILM COATED ORAL at 22:32

## 2023-01-05 RX ADMIN — SODIUM CHLORIDE, PRESERVATIVE FREE 10 ML: 5 INJECTION INTRAVENOUS at 19:10

## 2023-01-05 RX ADMIN — ACETAMINOPHEN 500 MG: 500 TABLET, FILM COATED ORAL at 11:05

## 2023-01-05 RX ADMIN — APIXABAN 2.5 MG: 2.5 TABLET, FILM COATED ORAL at 22:32

## 2023-01-05 RX ADMIN — ACETAMINOPHEN 500 MG: 500 TABLET, FILM COATED ORAL at 14:37

## 2023-01-05 RX ADMIN — ACETAMINOPHEN 500 MG: 500 TABLET, FILM COATED ORAL at 06:07

## 2023-01-05 RX ADMIN — POLYETHYLENE GLYCOL 3350 17 G: 17 POWDER, FOR SOLUTION ORAL at 11:05

## 2023-01-05 RX ADMIN — ESCITALOPRAM OXALATE 5 MG: 10 TABLET ORAL at 11:05

## 2023-01-05 RX ADMIN — METOPROLOL SUCCINATE 50 MG: 50 TABLET, EXTENDED RELEASE ORAL at 11:05

## 2023-01-05 RX ADMIN — ACETAMINOPHEN 500 MG: 500 TABLET, FILM COATED ORAL at 19:09

## 2023-01-05 RX ADMIN — APIXABAN 2.5 MG: 2.5 TABLET, FILM COATED ORAL at 11:05

## 2023-01-05 RX ADMIN — DOXYCYCLINE HYCLATE 100 MG: 100 TABLET, COATED ORAL at 19:09

## 2023-01-05 RX ADMIN — SODIUM CHLORIDE, PRESERVATIVE FREE 10 ML: 5 INJECTION INTRAVENOUS at 06:07

## 2023-01-05 RX ADMIN — SENNOSIDES AND DOCUSATE SODIUM 1 TABLET: 50; 8.6 TABLET ORAL at 11:05

## 2023-01-05 RX ADMIN — OXYCODONE HYDROCHLORIDE 5 MG: 5 TABLET ORAL at 22:33

## 2023-01-05 RX ADMIN — SENNOSIDES AND DOCUSATE SODIUM 1 TABLET: 50; 8.6 TABLET ORAL at 19:10

## 2023-01-05 RX ADMIN — ACETAMINOPHEN 500 MG: 500 TABLET, FILM COATED ORAL at 22:32

## 2023-01-05 RX ADMIN — DOXYCYCLINE HYCLATE 100 MG: 100 TABLET, COATED ORAL at 06:06

## 2023-01-05 NOTE — PROGRESS NOTES
Ortho NP Note    POD# 2  s/p LEFT TOTAL HIP ARTHROPLASTY ANTERIOR APPROACH   Pt seen with wife at bedside    Pt seated in chair. Per patient, pain to hip remains minimal.  Was able to sleep for portion of night, did wear CPAP. This morning, wife assisted patient to chair. Patient at present denies SOB however wife reports that with minimal exertion (ie walking to bathroom) patient with reported increased work of breathing. Subsides with rest.  Continues to use intermittent oxygen. Denies cough. No CP. No palpitations. Denies fever, chills. No N/V. Continues to require intermittent supplemental oxygen. Visit Vitals  /62 (BP 1 Location: Right upper arm, BP Patient Position: Sitting)   Pulse 74   Temp 99.7 °F (37.6 °C)   Resp 18   Ht 5' 11\" (1.803 m)   Wt 113.4 kg (250 lb)   SpO2 90%   BMI 34.87 kg/m²       Voiding status: spontaneous void, using urinal  Output (mL)  Urine Voided: 150 ml (01/05/23 0418)      Labs    Lab Results   Component Value Date/Time    HGB 11.2 (L) 01/04/2023 02:03 AM      Lab Results   Component Value Date/Time    INR 1.1 12/27/2022 11:38 AM      Lab Results   Component Value Date/Time    Sodium 138 01/05/2023 02:33 AM    Potassium 3.9 01/05/2023 02:33 AM    Chloride 105 01/05/2023 02:33 AM    CO2 28 01/05/2023 02:33 AM    Glucose 103 (H) 01/05/2023 02:33 AM    BUN 33 (H) 01/05/2023 02:33 AM    Creatinine 1.22 01/05/2023 02:33 AM    Calcium 8.7 01/05/2023 02:33 AM     Recent Glucose Results:   Lab Results   Component Value Date/Time     (H) 01/05/2023 02:33 AM           Body mass index is 34.87 kg/m². : A BMI > 30 is classified as obesity and > 40 is classified as morbid obesity. Aquacel dressing remains in place, c.d.i  Cryotherapy in place over incision  Calves soft and supple; No pain with passive stretch  Bilateral LEs warm, dry. 2+ DP pulses.     Sensation and motor intact - PF/DF/EHL intact 5/5  SCDs for mechanical DVT proph while in bed     1) PT: BID WBAT in hospital.  Therapy to be continued at home with HH--CM involved to arrange  2) Anticoagulation: Eliquis 2.5 mg PO BID in post op period, continue half dose x 5 days post op prior to resuming home dose of Eliquis 5 mg PO BID. Encouraged ongoing mobilization, bed exercises. 3) Pain - Multimodal approach including cryotherapy, scheduled tylenol with PRN oxycodone  4) Acute hypoxic respiratory failure: Hospitalist for co management--appreciate input. Likely due to exacerbation of chronic diastolic congestive heart failure. Torsemide 100 mg given today. Continuing doxycycline for presumed CAP as dx'ed at urgent care until completion on 1/8/22. 5) Post operative anemia: Hgb on 12/7: 13.4. EBL: 300 mL. Hgb on POD 1: 11.2. No active bleeding noted on exam. Expected Acute blood loss post-op anemia, continue to monitor. 6) CKD Stage 3A: Pre op Cr: 1.2, POD 1: 1.48, today: 1.22 (baseline). Caution with nephrotoxins. Repeat AM BMP if remains in hospital  7) Paroxysmal Atrial Fibrillation: Continue Toprol XL daily. Anticoagulation as per above. Continue routine VS.   8) JULES: CPAP order set placed, at bedside for use QHS  9) Post op care: Continue bowel regimen, encouraged IS. Follow up in 3-4 weeks with Dr Kristi Story. Aquacel to remain in place x 7 days unless integrity is lost.   10) Readiness for discharge:      [] Vital Signs stable : continues to require supplemental oxygen   [x] + Voiding    [x] Wound intact, drainage minimal    [x] Tolerating PO intake     [] Cleared by PT (OT if applicable)     [] Stair training completed (if applicable)    [] Independent / Contact Guard Assist (household distance)     [] Bed mobility     [] Car transfers     [] ADLs    [x] Adequate pain control on oral medication alone     Pending medical stability, therapy clearance. Discussed with hospitalist, physical therapist and RN.       Glenny Molina NP  Available via Perfect Serve    Discussed with Dr. Kristi Story

## 2023-01-05 NOTE — PROGRESS NOTES
[facility] Adult  Hospitalist Group                                                                                          Hospitalist Progress Note  Medhat Stapleton NP  Answering service: [phone] from in house phone        Date of Service:  2023  NAME:  Denise Garcia  :  1945  MRN:  [id number]      Admission Summary:   Denise Garcia is a 65 y.o. male who is postop day 1 from left total hip arthroplasty. Patient has a history of chronic diastolic congestive heart failure, AAA, hypertension as well as atrial fibrillation. Patient post arthroplasty was noted to have decreased oxygen saturations on room air, was down to 77%. Of note preop he did have chest radiograph which was concerning for atelectasis with possibility of pneumonia, was treated with doxycycline. Due to his oxygen desaturations, hospitalist service was consulted for assistance in management  At the moment of the initial interview the wife is at the bedside and provided further history. Patient denies dyspnea however does endorse fatigue. He denied chest pain, fever, chills, chest pressure, dizziness, lightheadedness       Interval history / Subjective: The patient this morning on rounds. Remains on 1 L/min of supplemental oxygen via nasal cannula. Otherwise saturations down to 87% on room air     Assessment & Plan:         Dyspnea/acute hypoxic respiratory failure due to exacerbation of chronic diastolic congestive heart failure  Saturations were down to 77 on room air. When I went in and saw him his saturations were mainly in the 90s however did drop down to 88% on room air. I did do a point-of-care echo including PLAX,PSAX, A4C, SX views, appears to have normal wall motion. IVC was leadpipe fat. Point-of-care ultrasound of the lungs with diffuse B-lines however no hepatitization.   Radiograph was done with low lung volumes and mild atelectasis, difficulty excluding mild interstitial pulmonary edema. On my read I did see fluid in the minor fissure consistent with fluid overload. Had not received any torsemide for the past couple of days as well as received IV fluids   diet  low-sodium  Increasing dose torsemide home dosage  Continuing metoprolol  Potassium WNLtoday    TnI 46  I was not able to appreciate any pneumonia on the radiograph however agree with continuing doxycycline to complete a course of treatment of previous diagnosis of community-acquired pneumonia     Paroxysmal atrial fibrillation  Rate currently regular. His wife tells me that he has mostly been in sinus rhythm however most recently in atrial flutter  We will continue with metoprolol as well as apixaban        Left AYDE  Pain currently controlled  Postop day 2  Management per primary team     The rest per primary team     Thank you for the opportunity to participate in the care of this patient. Hospitalist service will continue to follow with you               Hospital Problems  Date Reviewed: 12/28/2022            Codes Class Noted POA    Primary osteoarthritis of left hip ICD-10-CM: M16.12  ICD-9-CM: 715.15  1/3/2023 Unknown             Review of Systems:   A comprehensive review of systems was negative except for that written in the HPI. Vital Signs:    Last 24hrs VS reviewed since prior progress note. Most recent are:  Visit Vitals  /82 (BP 1 Location: Right upper arm)   Pulse 78   Temp 98.3 °F (36.8 °C)   Resp 18   Ht 5' 11\" (1.803 m)   Wt 113.4 kg (250 lb)   SpO2 94%   BMI 34.87 kg/m²         Intake/Output Summary (Last 24 hours) at 1/5/2023 4065  Last data filed at 1/5/2023 0418  Gross per 24 hour   Intake --   Output 350 ml   Net -350 ml        Physical Examination:     I had a face to face encounter with this patient and independently examined them on 1/5/2023 as outlined below:          Constitutional:  No acute distress, cooperative, pleasant    ENT:  Oral mucosa moist, oropharynx benign.     Resp:  CTA bilaterally. No wheezing/rhonchi/rales. No accessory muscle use. CV:  Regular rhythm, normal rate, no murmurs, gallops, rubs    GI:  Soft, non distended, non tender. normoactive bowel sounds, no hepatosplenomegaly     Musculoskeletal:  No edema, warm, 2+ pulses throughout    Neurologic:  Moves all extremities. AAOx3, CN II-XII reviewed            Data Review:    Review and/or order of clinical lab test  Review and/or order of tests in the radiology section of [facility]      Labs:     Recent Labs     01/04/23  0203   HGB 11.2*     Recent Labs     01/05/23  0233 01/04/23  0203    139   K 3.9 3.3*    103   CO2 28 30   BUN 33* 32*   CREA 1.22 1.48*   * 111*   CA 8.7 8.5     No results for input(s): ALT, AP, TBIL, TBILI, TP, ALB, GLOB, GGT, AML, LPSE in the last 72 hours. No lab exists for component: SGOT, GPT, AMYP, HLPSE  No results for input(s): INR, PTP, APTT, INREXT in the last 72 hours. No results for input(s): FE, TIBC, PSAT, FERR in the last 72 hours. No results found for: FOL, RBCF   No results for input(s): PH, PCO2, PO2 in the last 72 hours. No results for input(s): CPK, CKNDX, TROIQ in the last 72 hours.     No lab exists for component: CPKMB  No results found for: CHOL, CHOLX, CHLST, CHOLV, HDL, HDLP, LDL, LDLC, DLDLP, TGLX, TRIGL, TRIGP, CHHD, CHHDX  Lab Results   Component Value Date/Time    Glucose (POC) 88 01/03/2023 08:47 AM     Lab Results   Component Value Date/Time    Color YELLOW/STRAW 12/27/2022 11:38 AM    Appearance CLEAR 12/27/2022 11:38 AM    Specific gravity 1.018 12/27/2022 11:38 AM    pH (UA) 6.5 12/27/2022 11:38 AM    Protein 100 (A) 12/27/2022 11:38 AM    Glucose Negative 12/27/2022 11:38 AM    Ketone Negative 12/27/2022 11:38 AM    Bilirubin Negative 12/27/2022 11:38 AM    Urobilinogen 0.2 12/27/2022 11:38 AM    Nitrites Negative 12/27/2022 11:38 AM    Leukocyte Esterase Negative 12/27/2022 11:38 AM    Epithelial cells FEW 12/27/2022 11:38 AM    Bacteria Negative 12/27/2022 11:38 AM    WBC 0-4 12/27/2022 11:38 AM    RBC 0-5 12/27/2022 11:38 AM         Medications Reviewed:     Current Facility-Administered Medications   Medication Dose Route Frequency    torsemide (DEMADEX) tablet 20 mg  20 mg Oral DAILY    donepeziL (ARICEPT) tablet 5 mg  5 mg Oral QHS    escitalopram oxalate (LEXAPRO) tablet 5 mg  5 mg Oral DAILY    metoprolol succinate (TOPROL-XL) XL tablet 50 mg  50 mg Oral DAILY    atorvastatin (LIPITOR) tablet 20 mg  20 mg Oral QHS    sodium chloride (NS) flush 5-40 mL  5-40 mL IntraVENous Q8H    sodium chloride (NS) flush 5-40 mL  5-40 mL IntraVENous PRN    acetaminophen (TYLENOL) tablet 500 mg  500 mg Oral Q4HWA    oxyCODONE IR (ROXICODONE) tablet 2.5 mg  2.5 mg Oral Q3H PRN    oxyCODONE IR (ROXICODONE) tablet 5 mg  5 mg Oral Q3H PRN    naloxone (NARCAN) injection 0.4 mg  0.4 mg IntraVENous PRN    hydrOXYzine HCL (ATARAX) tablet 10 mg  10 mg Oral Q8H PRN    senna-docusate (PERICOLACE) 8.6-50 mg per tablet 1 Tablet  1 Tablet Oral BID    polyethylene glycol (MIRALAX) packet 17 g  17 g Oral DAILY    bisacodyL (DULCOLAX) suppository 10 mg  10 mg Rectal DAILY PRN    apixaban (ELIQUIS) tablet 2.5 mg  2.5 mg Oral BID    doxycycline (VIBRA-TABS) tablet 100 mg  100 mg Oral Q12H     ______________________________________________________________________  EXPECTED LENGTH OF STAY: - - -  ACTUAL LENGTH OF STAY:          0                 Colten Cook NP

## 2023-01-05 NOTE — PROGRESS NOTES
0800: Bedside and Verbal shift change report given to Kusum Mosquera (oncoming nurse) by [employer]). Report included the following information SBAR and Kardex. 1100:PT at bedside, pt desat to 80's during activity on RA. Pt placed on 2 L NC. Sats >90%. 1400: PT at bedside. 1600: Bedside and Verbal shift change report given to Nadya Phan (oncoming nurse) by Kusum Mosquera (offgoing nurse). Report included the following information SBAR, Kardex, and OR Summary.

## 2023-01-05 NOTE — PROGRESS NOTES
Problem: Self Care Deficits Care Plan (Adult)  Goal: *Acute Goals and Plan of Care (Insert Text)  Description: FUNCTIONAL STATUS PRIOR TO ADMISSION: Patient was independent and active without use of DME.    HOME SUPPORT: The patient lived alone with his wife to provide assistance. She assisted with LB dressing PRN. He has hx of prior hip sx and owns AE/DME. Occupational Therapy Goals  Initiated 1/4/2023  1. Patient will perform lower body ADLs with AE supervision/set-up within 4 day(s). 2.  Patient will perform upper body ADLs standing 5 mins without fatigue or LOB with supervision/set-up within 4 day(s). 3.  Patient will perform toilet transfer with supervision/set-up within 4 day(s). 4.  Patient will perform all aspects of toileting with supervision/set-up within 4 day(s). 5.  Patient will participate in upper extremity therapeutic exercise/activities with supervision/set-up for 10 minutes within 4 day(s). 6.  Patient will utilize energy conservation techniques during functional activities without cues within 4 day(s). Outcome: Progressing Towards Goal     OCCUPATIONAL THERAPY TREATMENT  Patient: Huber Dumont (57 y.o. male)  Date: 1/5/2023  Diagnosis: Primary osteoarthritis of left hip [M16.12] <principal problem not specified>  Procedure(s) (LRB):  LEFT TOTAL HIP ARTHROPLASTY ANTERIOR APPROACH (Left) 2 Days Post-Op  Precautions: Fall, WBAT  Chart, occupational therapy assessment, plan of care, and goals were reviewed. ASSESSMENT  Patient continues with skilled OT services and is progressing towards goals. Patient received sitting up in the chair and wife at bedside. He is agreeable to therapy, AOX4, and denies pain. Initial /71 and O2 at 92% on 1L NC. He is making good progress with functional transfers sit<>stand<>sit with SBA - supervision overall. He demonstrates safe use of RW during household functional ambulation in room.  Patient completed toileting with SBA - supervision to manage clothing and balance in standing position over toilet seat. Wife reports min assist for dressing, simple grooming, and chair transfer earlier this morning. After activity on room air, patient's O2 dropped to 82% and observed labored breathing. Encouraged PLB technique and donned 1LNC, which brought him back up to 93% after 2 minute rest break to recover. OT recs HHOT at discharge with 24/7 assistance from his wife. Current Level of Function Impacting Discharge (ADLs): min A - supervision     Other factors to consider for discharge: great social support, higher PLOF, owns DME         PLAN :  Patient continues to benefit from skilled intervention to address the above impairments. Continue treatment per established plan of care to address goals. Recommend with staff: up to chair 3x/day for meals, toileting    Recommend next OT session: UB therex, continue AE education     Recommendation for discharge: (in order for the patient to meet his/her long term goals)  Occupational therapy at least 2 days/week in the home     This discharge recommendation:  Has been made in collaboration with the attending provider and/or case management    IF patient discharges home will need the following DME: patient owns DME required for discharge       SUBJECTIVE:   Patient stated Elisa Hanson do it.     OBJECTIVE DATA SUMMARY:   Cognitive/Behavioral Status:  Neurologic State: Alert  Orientation Level: Oriented X4  Cognition: Follows commands    Functional Mobility and Transfers for ADLs:  Bed Mobility:  Supine to Sit:  (received in chair, did not go to bed; returned to chair)  Scooting: Supervision    Transfers:  Sit to Stand: Stand-by assistance  Functional Transfers  Bathroom Mobility: Stand-by assistance    Balance:  Sitting: Intact  Standing: Intact; With support  Standing - Static: Good;Constant support  Standing - Dynamic : Good;Constant support    ADL Intervention:  Feeding  Food to Mouth:  Independent  Drink to Mouth: Independent    Toileting  Bladder Hygiene: Stand-by assistance  Clothing Management: Stand-by assistance    Precautions: Patient recalled and demonstrated avoiding extreme planes of movement with Left LE during ADLs and functional mobility with minimal cues. Home safety: Patient instructed on home modifications and safety (raise height of ADL objects, appropriate height of chair surfaces, recliner safety, change of floor surfaces, clear pathways) to increase independence and fall prevention. Patient indicated understanding. Standing: Patient instructed and demonstrated to walk up to sink/countertop/surfaces, step into walker to increase safety of joint and fall prevention with Stand-by assistance. Instructed to apply concept of hip contraindications to ADLs within the home (no extreme reaching across body to Left side, square off while using objects, slide objects along surfaces). Patient instructed to increase amount of time standing, observe standing position during ADLs to increase even weight bearing through bilateral LEs to increase independence with ADLs. Goal to be reached 30 days post - op, per orthopedic surgeon or per PT. Patient indicated understanding. Pain:  0/10 throughout session     Activity Tolerance:   Good, desaturates with exertion and requires oxygen, requires rest breaks, and observed SOB with activity    After treatment patient left in no apparent distress:   Sitting in chair, Call bell within reach, Caregiver / family present, and RN present. COMMUNICATION/COLLABORATION:   The patients plan of care was discussed with: Registered nurse and NP.      Maicol Garzon OT  Time Calculation: 25 mins

## 2023-01-05 NOTE — PROGRESS NOTES
Transition of Care: [facility] when medically stable. [address] has accepted patient. Patient owns rolling walker. Patient currently on 02, however does not use home 02. Family will transport patient home    Medicare pt has received, reviewed, and signed first IM letter informing them of their right to appeal the discharge. Signed copy has been placed on pt bedside chart.       NAVID Carter/CRM

## 2023-01-05 NOTE — PROGRESS NOTES
Bedside shift change report given to Charis Goltz, RN (oncoming nurse) by [facility], RN (offgoing nurse). Report included the following information SBAR, Kardex, ED Summary, OR Summary, Procedure Summary, Intake/Output, MAR, Recent Results, and Med Rec Status.

## 2023-01-05 NOTE — PROGRESS NOTES
Bedside shift change report given to helene (oncoming nurse) by rae (offgoing nurse). Report included the following information SBAR, Kardex, Intake/Output, and MAR.

## 2023-01-05 NOTE — PROGRESS NOTES
Rounded on Denominational patients and provided Anointing of the Sick at request of patient.     Nikkie Wise

## 2023-01-06 VITALS
OXYGEN SATURATION: 93 % | TEMPERATURE: 98.4 F | BODY MASS INDEX: 35 KG/M2 | HEART RATE: 64 BPM | RESPIRATION RATE: 22 BRPM | WEIGHT: 250 LBS | HEIGHT: 71 IN | SYSTOLIC BLOOD PRESSURE: 128 MMHG | DIASTOLIC BLOOD PRESSURE: 77 MMHG

## 2023-01-06 PROCEDURE — 74011250637 HC RX REV CODE- 250/637

## 2023-01-06 PROCEDURE — 97110 THERAPEUTIC EXERCISES: CPT

## 2023-01-06 PROCEDURE — 74011250637 HC RX REV CODE- 250/637: Performed by: NURSE PRACTITIONER

## 2023-01-06 PROCEDURE — 74011250637 HC RX REV CODE- 250/637: Performed by: PHYSICIAN ASSISTANT

## 2023-01-06 PROCEDURE — 97116 GAIT TRAINING THERAPY: CPT

## 2023-01-06 RX ORDER — DOXYCYCLINE HYCLATE 100 MG
100 TABLET ORAL EVERY 12 HOURS
Qty: 5 TABLET | Refills: 0 | Status: SHIPPED | OUTPATIENT
Start: 2023-01-06 | End: 2023-01-09

## 2023-01-06 RX ADMIN — SENNOSIDES AND DOCUSATE SODIUM 1 TABLET: 50; 8.6 TABLET ORAL at 09:03

## 2023-01-06 RX ADMIN — TORSEMIDE 100 MG: 100 TABLET ORAL at 09:09

## 2023-01-06 RX ADMIN — POLYETHYLENE GLYCOL 3350 17 G: 17 POWDER, FOR SOLUTION ORAL at 09:00

## 2023-01-06 RX ADMIN — ACETAMINOPHEN 500 MG: 500 TABLET, FILM COATED ORAL at 09:08

## 2023-01-06 RX ADMIN — ESCITALOPRAM OXALATE 5 MG: 10 TABLET ORAL at 09:01

## 2023-01-06 RX ADMIN — DOXYCYCLINE HYCLATE 100 MG: 100 TABLET, COATED ORAL at 07:00

## 2023-01-06 RX ADMIN — METOPROLOL SUCCINATE 50 MG: 50 TABLET, EXTENDED RELEASE ORAL at 09:00

## 2023-01-06 RX ADMIN — APIXABAN 2.5 MG: 2.5 TABLET, FILM COATED ORAL at 09:02

## 2023-01-06 NOTE — PROGRESS NOTES
Problem: Falls - Risk of  Goal: *Absence of Falls  Description: Document Nirali Herman Fall Risk and appropriate interventions in the flowsheet.   Outcome: Resolved/Met     Problem: Patient Education: Go to Patient Education Activity  Goal: Patient/Family Education  Outcome: Resolved/Met     Problem: Patient Education: Go to Patient Education Activity  Goal: Patient/Family Education  Outcome: Resolved/Met     Problem: Patient Education: Go to Patient Education Activity  Goal: Patient/Family Education  Outcome: Resolved/Met

## 2023-01-06 NOTE — PROGRESS NOTES
Verbal shift change report given to Rosita Joya RN (oncoming nurse) by Yue Oden RN (offgoing nurse). Report included the following information SBAR.

## 2023-01-06 NOTE — PROGRESS NOTES
Transition of Care: Plan for discharge home with wife and HH. AT [address] has accepted patient. Patient owns RW. Order for home 02 approved with [employer] and portable tank has been delivered to bedside.  Family will transport patient home    NAVID Abdullahi/STACEY

## 2023-01-06 NOTE — PROGRESS NOTES
Overall doing well   Desats while ambulating with PT. Continue torsemide   Will complete the oral doxy until 1/9 as planned. Will need arrange Home O2. mikayla Dong to lam from medical standpoint with home O2. couple of days as well as received IV fluids   diet  low-sodium  Increasing dose torsemide home dosage  Continuing metoprolol  Potassium WNLtoday    TnI 46    Overall doing well   Desats while ambulating with PT.   Continue torsemide   Will complete the oral doxy until 1/9 as planned.   Will need arrange Home O2.   mikayla Dong to dc from medical standpoint with home O2.      Paroxysmal atrial fibrillation  Rate currently regular.  His wife tells me that he has mostly been in sinus rhythm however most recently in atrial flutter  We will continue with metoprolol as well as apixaban        Left AYDE  Pain currently controlled  Postop day 2  Management per primary team     The rest per primary team     Thank you for the opportunity to participate in the care of this patient.  Hospitalist service will continue to follow with you               Hospital Problems  Date Reviewed: 2/20/2023            Codes Class Noted POA    S/P total left hip arthroplasty ICD-10-CM: Z96.642  ICD-9-CM: V43.64  1/5/2023 Unknown        Primary osteoarthritis of left hip ICD-10-CM: M16.12  ICD-9-CM: 715.15  1/3/2023 Unknown           Review of Systems:   A comprehensive review of systems was negative except for that written in the HPI.       Vital Signs:    Last 24hrs VS reviewed since prior progress note. Most recent are:  Visit Vitals  /77 (BP 1 Location: Left upper arm, BP Patient Position: Sitting)   Pulse 64   Temp 98.4 °F (36.9 °C)   Resp 22   Ht 5' 11\" (1.803 m)   Wt 113.4 kg (250 lb)   SpO2 93%   BMI 34.87 kg/m²       No intake or output data in the 24 hours ending 03/06/23 1951       Physical Examination:     I had a face to face encounter with this patient and independently examined them on 3/6/2023 as outlined below:          Constitutional:  No acute distress, cooperative, pleasant    ENT:  Oral mucosa moist, oropharynx benign.    Resp:  CTA bilaterally. No wheezing/rhonchi/rales. No accessory muscle use.    CV:   Regular rhythm, normal rate, no murmurs, gallops, rubs    GI:  Soft, non distended, non tender. normoactive bowel sounds, no hepatosplenomegaly     Musculoskeletal:  No edema, warm, 2+ pulses throughout    Neurologic:  Moves all extremities.  AAOx3, CN II-XII reviewed            Data Review:    Review and/or order of clinical lab test  Review and/or order of tests in the radiology section of [facility]      Labs:     No results for input(s): WBC, HGB, HCT, PLT, HGBEXT, HCTEXT, PLTEXT, HGBEXT, HCTEXT, PLTEXT in the last 72 hours.    No results for input(s): NA, K, CL, CO2, BUN, CREA, GLU, CA, MG, PHOS, URICA in the last 72 hours.    No results for input(s): ALT, AP, TBIL, TBILI, TP, ALB, GLOB, GGT, AML, LPSE in the last 72 hours.    No lab exists for component: SGOT, GPT, AMYP, HLPSE  No results for input(s): INR, PTP, APTT, INREXT, INREXT in the last 72 hours.   No results for input(s): FE, TIBC, PSAT, FERR in the last 72 hours.   No results found for: FOL, RBCF   No results for input(s): PH, PCO2, PO2 in the last 72 hours.  No results for input(s): CPK, CKNDX, TROIQ in the last 72 hours.    No lab exists for component: CPKMB  No results found for: CHOL, CHOLX, CHLST, CHOLV, HDL, HDLP, LDL, LDLC, DLDLP, TGLX, TRIGL, TRIGP, CHHD, CHHDX  Lab Results   Component Value Date/Time    Glucose (POC) 88 01/03/2023 08:47 AM     Lab Results   Component Value Date/Time    Color YELLOW/STRAW 12/27/2022 11:38 AM    Appearance CLEAR 12/27/2022 11:38 AM    Specific gravity 1.018 12/27/2022 11:38 AM    pH (UA) 6.5 12/27/2022 11:38 AM    Protein 100 (A) 12/27/2022 11:38 AM    Glucose Negative 12/27/2022 11:38 AM    Ketone Negative 12/27/2022 11:38 AM    Bilirubin Negative 12/27/2022 11:38 AM    Urobilinogen 0.2 12/27/2022 11:38 AM    Nitrites Negative 12/27/2022 11:38 AM    Leukocyte Esterase Negative 12/27/2022 11:38 AM    Epithelial cells FEW 12/27/2022 11:38 AM    Bacteria Negative 12/27/2022 11:38 AM    WBC 0-4 12/27/2022 11:38 AM     RBC 0-5 12/27/2022 11:38 AM         Medications Reviewed:     No current facility-administered medications for this encounter.     Current Outpatient Medications   Medication Sig    apixaban (Eliquis) 2.5 mg tablet Take 1 Tablet by mouth two (2) times a day. Indications: deep vein thrombosis prevention    acetaminophen (TYLENOL) 500 mg tablet Take 1 Tablet by mouth every four (4) hours.    torsemide (DEMADEX) 100 mg tablet Take 100 mg by mouth daily.    donepeziL (ARICEPT) 5 mg tablet Take 5 mg by mouth nightly.    escitalopram oxalate (LEXAPRO) 5 mg tablet Take 5 mg by mouth daily.    cetirizine (ZYRTEC) 10 mg tablet Take 10 mg by mouth nightly.    cholecalciferol (VITAMIN D3) (1000 Units /25 mcg) tablet Take 2,000 Units by mouth daily.    metoprolol succinate (TOPROL-XL) 50 mg XL tablet Take 50 mg by mouth daily.    simvastatin (ZOCOR) 40 mg tablet Take 40 mg by mouth nightly.    cyanocobalamin (VITAMIN B12) 1,000 mcg/mL injection 1,000 mcg by IntraMUSCular route every month. HAS ALREADY HAD IN DEC. 2022     ______________________________________________________________________  EXPECTED LENGTH OF STAY: 3d 2h  ACTUAL LENGTH OF STAY:          1                 Alice Louie MD

## 2023-01-06 NOTE — PROGRESS NOTES
Occupational Therapy    Completed chart review and attempted OT session. Patient received up in chair, dressed. Plans for dc home with wife this afternoon. He is aware of hip precautions and has dressing AD in room. He denies any concerns with ADL tasks.        Thank you,    Aleksandar Kimble, OT

## 2023-01-06 NOTE — PROGRESS NOTES
Ortho NP Note    POD# 3  s/p LEFT TOTAL HIP ARTHROPLASTY ANTERIOR APPROACH   Pt seen with hospitalist    Pt seated in chair. States he wore CPAP overnight last night, was able to sleep off and on. Pain has remained tolerable to left hip, only using oxycodone at night to help get comfortable to sleep. Wife at bedside reports patient continues to ambulate in room, transferring bed to chair. Per wife, home itself is 1200 sq feet and even prior to surgery patient walking maybe 40 feet maximum in between bedroom/living area d/t diminished baseline functional status. VSS Afebrile. Patient on RA. Visit Vitals  /71 (BP 1 Location: Left upper arm)   Pulse 64   Temp 98.4 °F (36.9 °C)   Resp 22   Ht 5' 11\" (1.803 m)   Wt 113.4 kg (250 lb)   SpO2 91%   BMI 34.87 kg/m²       Voiding status: spontaneous void. Output (mL)  Urine Voided: 280 ml (01/06/23 0828)  Unmeasurable Output  Urine Occurrence(s): 1 (01/06/23 0828)      Labs    Lab Results   Component Value Date/Time    HGB 11.2 (L) 01/04/2023 02:03 AM      Lab Results   Component Value Date/Time    INR 1.1 12/27/2022 11:38 AM      Lab Results   Component Value Date/Time    Sodium 138 01/05/2023 02:33 AM    Potassium 3.9 01/05/2023 02:33 AM    Chloride 105 01/05/2023 02:33 AM    CO2 28 01/05/2023 02:33 AM    Glucose 103 (H) 01/05/2023 02:33 AM    BUN 33 (H) 01/05/2023 02:33 AM    Creatinine 1.22 01/05/2023 02:33 AM    Calcium 8.7 01/05/2023 02:33 AM     Recent Glucose Results: No results found for: GLU, GLUPOC, GLUCPOC        Body mass index is 34.87 kg/m². : A BMI > 30 is classified as obesity and > 40 is classified as morbid obesity. Aquacel dressing to left hip c.d.i  Cryotherapy in place over incision  Calves soft and supple; No pain with passive stretch  Bilateral LEs warm, dry. 2+ DP pulses.     Sensation and motor intact - PF/DF/EHL intact 5/5  SCDs for mechanical DVT proph while in bed     PLAN:  1) PT: BID WBAT in hospital, goals adjusted to reflect patient's small home, limited pre op functional status. Therapy to be continued at home with HH--CM involved to arrange, [facility]  2) Anticoagulation: Eliquis 2.5 mg PO BID in post op period, continue half dose x 5 days post op prior to resuming home dose of Eliquis 5 mg PO BID. Encouraged ongoing mobilization, bed exercises. 3) Pain - Multimodal approach including cryotherapy, scheduled tylenol with PRN oxycodone  4) Acute hypoxic respiratory failure: Hospitalist for co management for exacerbation of chronic heart failure--appreciate input, cleared on POD 3 for discharge to home by Dr. Elissa Eric. Torsemide 100 mg PO. Continuing doxycycline for presumed CAP as dx'ed at urgent care until completion on 1/8/22. Recommending follow up with PCP/cardiology. 5) Post operative anemia: Hgb on 12/7: 13.4. EBL: 300 mL. Hgb on POD 1: 11.2. No active bleeding noted on exam. Expected Acute blood loss post-op anemia, continue to monitor. 6) CKD Stage 3A: Pre op Cr: 1.2, POD 1: 1.48, POD 2: 1.22 (baseline). Caution with nephrotoxins. 7) Paroxysmal Atrial Fibrillation: Continue Toprol XL daily. Anticoagulation as per above. Continue routine VS.   8) JULES: CPAP order set placed, at bedside for use QHS  9) Post op care: Continue bowel regimen, encouraged IS. Follow up in 3-4 weeks with Dr Barrera Abts. Aquacel to remain in place x 7 days unless integrity is lost.   10) Readiness for discharge:      [x] Vital Signs stable    [x] + Voiding    [x] Wound intact, drainage minimal    [x] Tolerating PO intake     [] Cleared by PT (OT if applicable)     [] Stair training completed (if applicable)    [] Independent / Contact Guard Assist (household distance)     [] Bed mobility     [] Car transfers     [] ADLs    [x] Adequate pain control on oral medication alone     Cleared by hospitalist for d/c home, home 02 to be set up. Home with home health and wife's support.     Mitchell Ibanez NP  Available via [employer]  Discussed with surgeon/team.  O2 delivered and at bedside at time of discharge.

## 2023-01-06 NOTE — PROGRESS NOTES
Problem: Mobility Impaired (Adult and Pediatric)  Goal: *Acute Goals and Plan of Care (Insert Text)  Description: FUNCTIONAL STATUS PRIOR TO ADMISSION: Patient was independent and active without use of DME.    HOME SUPPORT PRIOR TO ADMISSION: The patient lived with wife but did not require assist.    Physical Therapy Goals  Initiated 1/3/2023    1. Patient will move from supine to sit and sit to supine , scoot up and down, and roll side to side in bed with modified independence within 4 days. 2. Patient will perform sit to stand with modified independence within 4 days. 3. Patient will ambulate with modified independence for 150 feet with the least restrictive device within 4 days. 4. Patient will ascend/descend 4 stairs with cane and one handrail(s) with modified independence within 4 days. 5. Patient will perform post-AYDE home exercise program per protocol with independence within 4 days. Outcome: Progressing Towards Goal   PHYSICAL THERAPY TREATMENT  Patient: Roscoe Slater (86 y.o. male)  Date: 1/6/2023  Diagnosis: Primary osteoarthritis of left hip [M16.12]  S/P total left hip arthroplasty [Z96.642] <principal problem not specified>  Procedure(s) (LRB):  LEFT TOTAL HIP ARTHROPLASTY ANTERIOR APPROACH (Left) 3 Days Post-Op  Precautions: Fall, WBAT  Chart, physical therapy assessment, plan of care and goals were reviewed. ASSESSMENT  Patient continues with skilled PT services and is progressing towards goals. Patient received sitting in chair, agreeable to therapy. Resting on room air with 88-90%. With ambulation, desat to 85% on room air and with seated rest, recovered to 89-90%. Educated on exercises to be complete in the chair with quad sets and glut sets. Discussed frequent position changes and mobility at home. Has been cleared from a functional perspective for discharge. Recommend HHPT.       Current Level of Function Impacting Discharge (mobility/balance): SBA    Other factors to consider for discharge: desat on room air          PLAN :  Patient continues to benefit from skilled intervention to address the above impairments. Continue treatment per established plan of care. to address goals. Recommendation for discharge: (in order for the patient to meet his/her long term goals)  Physical therapy at least 2 days/week in the home     This discharge recommendation:  Has been made in collaboration with the attending provider and/or case management    IF patient discharges home will need the following DME: portable oxygen       SUBJECTIVE:   Patient stated I'll sit up in the chair.     OBJECTIVE DATA SUMMARY:   Critical Behavior:  Neurologic State: Alert  Orientation Level: Oriented X4  Cognition: Follows commands  Safety/Judgement: Awareness of environment  Functional Mobility Training:  Transfers:  Sit to Stand: Supervision  Stand to Sit: Supervision  Balance:  Sitting: Intact  Standing: Intact; With support  Ambulation/Gait Training:  Distance (ft): 50 Feet (ft)  Assistive Device: Walker, rolling;Gait belt  Ambulation - Level of Assistance: Stand-by assistance  Gait Abnormalities: Antalgic; Step to gait  Left Side Weight Bearing: As tolerated  Stance: Left decreased  Speed/Cleopatra: Slow  Step Length: Right shortened;Left shortened  Swing Pattern: Left asymmetrical      Therapeutic Exercises:   SUPINE  EXERCISES   Sets   Reps   Active Active Assist   Passive Self ROM   Comments   Ankle Pumps   []                                        []                                        []                                        []                                           Quad Sets   []                                        []                                        []                                        []                                        Reviewed sitting in chair with feet elevated   Heel Slides   []                                        []                                        [] []                                           Hip Abduction   []                                        []                                        []                                        []                                           Glut Sets   []                                        []                                        []                                        []                                        Reviewed sitting in chair with feet elevated      []                                        []                                        []                                        []                                              []                                        []                                        []                                        []                                               Pain Ratin/10 at rest     Activity Tolerance:   Fair, desaturates with exertion and requires oxygen, and requires rest breaks      After treatment patient left in no apparent distress:   Sitting in chair, Heels elevated for pressure relief, Call bell within reach, and Caregiver / family present    COMMUNICATION/COLLABORATION:   The patients plan of care was discussed with: Occupational therapist and Registered nurse.      Ez Cisneross, PT, DPT   Time Calculation: 23 mins

## 2023-01-09 NOTE — PROGRESS NOTES
[facility]  [facility] Orthopaedic Pathway Handoff     FROM:                                TO: At [address]                                                      ([facility] or Facility name)  Marcial Vega 54  [address] [address]  Dept: [address] [facility]: [phone]                                      Room#:  944/01                                                       Nurse Navigator: Osmin Mclaughlin RN         SITUATION      ASAScore: ASA 3 - Patient with moderate systemic disease with functional limitations    Admitted:  1/3/2023  Hospital Day: 4      Attending Provider:  No att. providers found     Consultations:  IP CONSULT TO HOSPITALIST    PCP:  Luis Antonio Martinez MD   [phone]     Admitting Dx:  Primary osteoarthritis of left hip [M16.12]  S/P total left hip arthroplasty [M34.029]       Active Problems:    Primary osteoarthritis of left hip (1/3/2023)      S/P total left hip arthroplasty (1/5/2023)      6 Days Post-Op of   Procedure(s):  LEFT TOTAL HIP ARTHROPLASTY ANTERIOR APPROACH   BY: Michael Gómez MD             ON: 1/3/2023                  Code Status: Prior             Advance Directive? Not Received (Send w/patient)     Isolation:  There are currently no Active Isolations       MDRO: No current active infections    BACKGROUND     Allergies:  No Known Allergies    Past Medical History:   Diagnosis Date    Abdominal aortic aneurysm (AAA) without rupture     Adverse effect of anesthesia 2020    confusion and memory loss after anesthesia    Arrhythmia     AFIB    Arthropathy     Basal cell carcinoma     LOW BACK, HEAD, NECK    Cerebrovascular disease     Chronic heart failure with preserved ejection fraction (HCC)     Dyslipidemia     Erectile dysfunction     Essential hypertension     GERD (gastroesophageal reflux disease)     H/O maze procedure     Heart failure ([facility] [address]     CHF    Hypertension     Mild intermittent asthma Osteoarthritis of right hip     Psychiatric disorder     DEPRESSION    S/P AVR (aortic valve replacement) and aortoplasty     Sleep apnea     CPAP USE    Stroke ([facility] [address]     DURING SURGERY FOR AORTIC VALVE       Past Surgical History:   Procedure Laterality Date    HX COLONOSCOPY      HX HEART VALVE SURGERY      AORTIC VALVE    HX HEENT Bilateral     CATARACTS    HX HIP REPLACEMENT Right     2014 and 20    HX OTHER SURGICAL      Alzheimer's    HX TONSIL AND ADENOIDECTOMY      MO UNLISTED PROCEDURE CARDIAC SURGERY      MO UNLISTED PROCEDURE VASCULAR SURGERY      peripheral left leg       Prior to Admission Medications   Prescriptions Last Dose Informant Patient Reported? Taking? apixaban (ELIQUIS) 5 mg tablet 2022  Yes No   Sig: Take 5 mg by mouth two (2) times a day. cetirizine (ZYRTEC) 10 mg tablet 2023  Yes Yes   Sig: Take 10 mg by mouth nightly. cholecalciferol (VITAMIN D3) (1000 Units /25 mcg) tablet 2022  Yes Yes   Sig: Take 2,000 Units by mouth daily. cyanocobalamin (VITAMIN B12) 1,000 mcg/mL injection   Yes No   Si,000 mcg by IntraMUSCular route every month. HAS ALREADY HAD IN DEC. 2022   donepeziL (ARICEPT) 5 mg tablet 2023  Yes Yes   Sig: Take 5 mg by mouth nightly. escitalopram oxalate (LEXAPRO) 5 mg tablet 1/3/2023 at 0600  Yes Yes   Sig: Take 5 mg by mouth daily. metoprolol succinate (TOPROL-XL) 50 mg XL tablet 1/3/2023 at 0600  Yes Yes   Sig: Take 50 mg by mouth daily. simvastatin (ZOCOR) 40 mg tablet 2023  Yes Yes   Sig: Take 40 mg by mouth nightly. torsemide (DEMADEX) 100 mg tablet 2023  Yes Yes   Sig: Take 100 mg by mouth daily.       Facility-Administered Medications: None       Vaccinations:    Immunization History   Administered Date(s) Administered    COVID-19, MODERNA BLUE border, Primary or Immunocompromised, (age 18y+), IM, 100 mcg/0.5mL 2021, 2021    Influenza High Dose Vaccine PF 10/13/2019    Influenza Vaccine 10/10/2013, 10/13/2014, 11/10/2015, 10/25/2016, 11/10/2017, 10/16/2018, 10/07/2020    Pneumococcal Conjugate (PCV-13) 06/24/2015    Pneumococcal Polysaccharide (PPSV-23) 01/01/2010    Td 05/09/2019    Tdap 01/01/2008    Zoster Recombinant 09/07/2019         ASSESSMENT   Age: 65 y.o. Gender: male        Height: Height: 5' 11\" (180.3 cm)                    Weight:Weight: 113.4 kg (250 lb)     No data found. Active Orders   There are no active orders of the following types: Diet. Orientation: Orientation Level: Oriented X4    Active Lines/Drains:  (Peg Tube / Kurtz / CL or S/L?):no    Urinary Status: Voiding      Last BM:       Skin Integrity: Incision (comment)             Mobility: Slightly limited   Weight Bearing Status: WBAT (Weight Bearing as Tolerated)      Gait Training  Assistive Device: Walker, rolling, Gait belt  Ambulation - Level of Assistance: Stand-by assistance  Distance (ft): 50 Feet (ft)  Interventions: Safety awareness training, Verbal cues     On Anticoagulation? YES  Eliquis                                         Pain Medications given:  Oxycodone                                   Lab Results   Component Value Date/Time    Glucose 103 (H) 01/05/2023 02:33 AM    Hemoglobin A1c 5.6 12/27/2022 11:38 AM    INR 1.1 12/27/2022 11:38 AM    HGB 11.2 (L) 01/04/2023 02:03 AM    HGB 12.8 05/20/2020 12:00 AM       Readmission Risks:  Score:         RECOMMENDATION     See After Visit Summary (AVS) for:  Discharge instructions  After [address] [place] [address]   Medication Reconciliation          [facility] Orthopaedic Nurse Navigator  GERALD Lynn, RN      Office  [phone]  Fax      [phone]  [email or web address]             . Jl

## 2023-01-10 NOTE — PROGRESS NOTES
Physician Progress Note      PATIENT:               Renetta Vences  CSN #:                  [id number]  :                       1945  ADMIT DATE:       1/3/2023 6:45 AM  DISCH DATE:        2023 2:29 PM  RESPONDING  PROVIDER #:        Pepe Solano NP          QUERY TEXT:    Patient admitted for left total hip arthroplasty, noted to have paroxysmal atrial fibrillation and is maintained on Eliquis. If possible, please document in progress notes and discharge summary if you are evaluating and/or treating any of the following: The medical record reflects the following:  Risk Factors: 78yo with pAFib    Clinical Indicators:  Ortho PN  2) Anticoagulation: Eliquis 2.5 mg PO BID in post op period, continue half dose x 5 days post op prior to resuming home dose of Eliquis 5 mg PO BID . Encouraged ongoing mobilization, bed exercises. 8) Paroxysmal Atrial Fibrillation: Continue Toprol XL daily. Anticoagulation as per above. Continue routine VS.    Treatment: Eliquis, Toprol XL, VS per unit routine    Thank you,  Ja Starr  [phone]  I am also available via Perfect Serve. Options provided:  -- Secondary hypercoagulable state in a patient with atrial fibrillation  -- Other - I will add my own diagnosis  -- Disagree - Not applicable / Not valid  -- Disagree - Clinically unable to determine / Unknown  -- Refer to Clinical Documentation Reviewer    PROVIDER RESPONSE TEXT:    Provider disagreed with this query.     Query created by: Hugo Chacon on 2023 11:16 AM      Electronically signed by:  Pepe Solano NP 1/10/2023 12:16 PM

## 2023-01-13 ENCOUNTER — TELEPHONE (OUTPATIENT)
Dept: ORTHOPEDIC SURGERY | Age: 78
End: 2023-01-13

## 2023-01-13 NOTE — TELEPHONE ENCOUNTER
Spoke with patient's wife 1/12/23. Currently admitted for CHF flare and treated for left leg \"cellulitis\", redness resolving. Will work on ambulation 1/13. If successful, he will d/c home. If unsuccessful, looking like rehab is next step. We will keep his scheduled follow up appointment for now, wife will update us along the way.     ----- Message from Lien Amin sent at 1/11/2023 10:31 AM EST -----  Regarding: FW: Left hip replacement   LTK: 1/3  ----- Message -----  From: Madhu Littlejohn  Sent: 1/10/2023   8:46 PM EST  To: Enrique Nurses  Subject: Left hip replacement                             GULSHANVICTOR HUGORambo Child was admitted with CHF and cellulitis left leg to [facility]

## 2023-01-16 NOTE — PROGRESS NOTES
Physician Progress Note      PATIENT:               Samantha Jules  CSN #:                  [id number]  :                       1945  ADMIT DATE:       1/3/2023 6:45 AM  DISCH DATE:        2023 2:29 PM  RESPONDING  PROVIDER #:        Zuly Dukes NP          QUERY TEXT:    Patient admitted to observation status on 1/3/2023 for left total hip arthroplasty. Patient noted to develop hypoxia and has JULES with BMI of 35.06. Patient uses CPAP at home and while in house. Noted inpatient admission order on 2023. If possible, please document in progress notes and discharge summary the reason for inpatient admission. The medical record reflects the following:  Risk Factors: 78yo with obesity and BMI >30, JULES, and uses CPAP    Clinical Indicators:  Patient developed hypoxia-// and placed on 1-2L of O2. Patient does have JULES  serum bicarb of 30, 28  BMI-35.06  uses Cpap    Treatment: CPAP, chem 7 labs followed, hospitalist team following    Thank you,  Sanjuana Dyson  [phone]  I am also available via Perfect Serve. Options provided:  -- Inpatient admission for obesity hypoventilation syndrome/Pickwickian syndrome  -- Inpatient admission for, Please specify diagnosis.   -- Other - I will add my own diagnosis  -- Disagree - Not applicable / Not valid  -- Disagree - Clinically unable to determine / Unknown  -- Refer to Clinical Documentation Reviewer    PROVIDER RESPONSE TEXT:    This patient was admitted inpatient for Dyspnea/acute hypoxic respiratory failure due to exacerbation of chronic diastolic congestive heart failure    Query created by: Jennifer Mansfield on 2023 10:31 AM      Electronically signed by:  Zuly Dukes NP 2023 12:12 PM

## 2023-01-23 ENCOUNTER — OFFICE VISIT (OUTPATIENT)
Dept: ORTHOPEDIC SURGERY | Age: 78
End: 2023-01-23
Payer: MEDICARE

## 2023-01-23 VITALS — HEIGHT: 72 IN | WEIGHT: 242 LBS | BODY MASS INDEX: 32.78 KG/M2

## 2023-01-23 DIAGNOSIS — Z96.642 STATUS POST TOTAL HIP REPLACEMENT, LEFT: Primary | ICD-10-CM

## 2023-01-23 DIAGNOSIS — Z09 SURGERY FOLLOW-UP: ICD-10-CM

## 2023-01-23 PROCEDURE — 99024 POSTOP FOLLOW-UP VISIT: CPT | Performed by: PHYSICIAN ASSISTANT

## 2023-01-23 NOTE — LETTER
1/23/2023    Patient: Figueroa Spangler   YOB: 1945   Date of Visit: 1/23/2023     Rivas White MD  [address] [place] [address]  [facility] [address]  Via Fax: [phone]    Dear Rivas White MD,      Thank you for referring Mr. Figueroa Spangler to [facility] for evaluation. My notes for this consultation are attached. If you have questions, please do not hesitate to call me. I look forward to following your patient along with you.       Sincerely,    Mary Whitney PA-C

## 2023-01-23 NOTE — PROGRESS NOTES
Gerardo Gan (: 1945) is a 65 y.o. male, patient, here for evaluation of the following chief complaint(s):  Surgical Follow-up (PO #1: LTH: 1/3/23/)       SUBJECTIVE/OBJECTIVE:  Gerardo Gan presents today for first postop visit status post left total hip arthroplasty, anterior approach, on 1/3/23. Postop course complicated by CHF flare. He was sent home on O2 but was admitted back to [facility] a few days later for breathing issues as well as \"cellulitis\" which was treated with ABX. He was discharged home and has been working with [facility] ever since. States he has been off of oxygen for 3 or 4 days now, checking his oxygen periodically, ~ 6 times a day, ranging 89 to 90%, 93-94 with purposeful deep breathing. On his preop dose of Eliquis. Only taking Tylenol as needed for pain. Not taking any antibiotics. PHYSICAL EXAM:  Vitals: Ht 6' (1.829 m)   Wt 242 lb (109.8 kg)   BMI 32.82 kg/m²   Body mass index is 32.82 kg/m². 65y.o. year old M in no acute distress. Ambulates with a slight limp on the left, walker for assistance. Difficulty rising from a chair due to right wrist pain. Left hip anterior incision well approximated, well-healing. Some eschar remains, but no diffuse erythema, warmth, or drainage. Motor 5/5. IMAGING:  Radiographs: XR Results (most recent):  Results from Appointment encounter on 23    XR HIP LT W OR WO PELV 2-3 VWS    Narrative  3 views of the left hip today including AP pelvis, AP and frog lateral using digital radiography demonstrate satisfactory position and alignment of cementless total hip components. No changes compared to immediate postoperative x-rays. ASSESSMENT/PLAN:  1. Status post total hip replacement, left  -     XR HIP LT W OR WO PELV 2-3 VWS; Future  -     REFERRAL TO PHYSICAL THERAPY  2. Surgery follow-up    First postop visit status post left total hip arthroplasty, anterior approach, on 1/3/2023.   Postop course complicated by CHF flare and cellulitis. Both seem to be resolving or resolved. Continue preop Eliquis dose. Continue Tylenol as needed. Outpatient physical therapy prescription provided, and I encouraged him to make an appointment by next week pending transportation. He was with his son today who took good notes, and will report back to the patient's wife. Recommend follow-up in 4 weeks for clinical recheck, sooner if needed. Return in about 4 weeks (around 2/20/2023) for POV #2 with Dr. Valdo Phillips. Review Of Systems  ROS    Positive for: Skin, Musculoskeletal  Last edited by Hany Pérez on 1/23/2023 10:16 AM.         Patient denies any recent fever, chills, nausea, vomiting, chest pain, or shortness of breath. No Known Allergies    Current Outpatient Medications   Medication Sig    apixaban (Eliquis) 2.5 mg tablet Take 1 Tablet by mouth two (2) times a day. Indications: deep vein thrombosis prevention    torsemide (DEMADEX) 100 mg tablet Take 100 mg by mouth daily. escitalopram oxalate (LEXAPRO) 5 mg tablet Take 5 mg by mouth daily. cetirizine (ZYRTEC) 10 mg tablet Take 10 mg by mouth nightly. metoprolol succinate (TOPROL-XL) 50 mg XL tablet Take 50 mg by mouth daily. simvastatin (ZOCOR) 40 mg tablet Take 40 mg by mouth nightly. senna-docusate (PERICOLACE) 8.6-50 mg per tablet Take 1 Tablet by mouth two (2) times daily as needed for Constipation. (Patient not taking: Reported on 1/23/2023)    acetaminophen (TYLENOL) 500 mg tablet Take 1 Tablet by mouth every four (4) hours. cyanocobalamin (VITAMIN B12) 1,000 mcg/mL injection 1,000 mcg by IntraMUSCular route every month. HAS ALREADY HAD IN DEC. 2022    donepeziL (ARICEPT) 5 mg tablet Take 5 mg by mouth nightly. cholecalciferol (VITAMIN D3) (1000 Units /25 mcg) tablet Take 2,000 Units by mouth daily. No current facility-administered medications for this visit.        Past Medical History:   Diagnosis Date    Abdominal aortic aneurysm (AAA) without rupture     Adverse effect of anesthesia 2020    confusion and memory loss after anesthesia    Arrhythmia     AFIB    Arthropathy     Basal cell carcinoma     LOW BACK, HEAD, NECK    Cerebrovascular disease     Chronic heart failure with preserved ejection fraction (HCC)     Dyslipidemia     Erectile dysfunction     Essential hypertension     GERD (gastroesophageal reflux disease)     H/O maze procedure     Heart failure ([facility] [address]     CHF    Hypertension     Mild intermittent asthma     Osteoarthritis of right hip     Psychiatric disorder     DEPRESSION    S/P AVR (aortic valve replacement) and aortoplasty     Sleep apnea     CPAP USE    Stroke ([facility] [address] 2011    DURING SURGERY FOR AORTIC VALVE        Past Surgical History:   Procedure Laterality Date    HX COLONOSCOPY      HX HEART VALVE SURGERY  2011    AORTIC VALVE    HX HEENT Bilateral     CATARACTS    HX HIP REPLACEMENT Right     4/2014 and 5/20/20    HX HIP REPLACEMENT Left 01/03/2023    HX OTHER SURGICAL  2022    Alzheimer's    HX TONSIL AND ADENOIDECTOMY      SC UNLISTED PROCEDURE CARDIAC SURGERY  2011    SC UNLISTED PROCEDURE VASCULAR SURGERY  2021    peripheral left leg       Family History   Problem Relation Age of Onset    Heart Disease Mother     Kidney Disease Father     Diabetes Sister     Diabetes Brother     Diabetes Brother     Heart Failure Other     Anesth Problems Neg Hx         Social History     Socioeconomic History    Marital status:      Spouse name: Not on file    Number of children: Not on file    Years of education: Not on file    Highest education level: Not on file   Occupational History    Not on file   Tobacco Use    Smoking status: Never    Smokeless tobacco: Never   Vaping Use    Vaping Use: Never used   Substance and Sexual Activity    Alcohol use: Never    Drug use: Never    Sexual activity: Not Currently     Partners: Female     Birth control/protection: None   Other Topics Concern    Not on file   Social History Narrative Not on file     Social Determinants of Health     Financial Resource Strain: Not on file   Food Insecurity: Not on file   Transportation Needs: Not on file   Physical Activity: Not on file   Stress: Not on file   Social Connections: Not on file   Intimate Partner Violence: Not on file   Housing Stability: Not on file         Orders Placed This Encounter    XR HIP LT W OR WO PELV 2-3 VWS     Standing Status:   Future     Number of Occurrences:   1     Standing Expiration Date:   1/24/2024    REFERRAL TO PHYSICAL THERAPY     Referral Priority:   Routine     Referral Type:   PT/OT/ST     Referral Reason:   Specialty Services Required     Number of Visits Requested:   [address] Renetta Wright M.D. was available for immediate consultation as the supervising physician. An electronic signature was used to authenticate this note.   -- Clarice Bee PA-C

## 2023-02-20 ENCOUNTER — OFFICE VISIT (OUTPATIENT)
Dept: ORTHOPEDIC SURGERY | Age: 78
End: 2023-02-20
Payer: MEDICARE

## 2023-02-20 VITALS — HEIGHT: 72 IN | BODY MASS INDEX: 34.27 KG/M2 | WEIGHT: 253 LBS

## 2023-02-20 DIAGNOSIS — Z09 SURGERY FOLLOW-UP: ICD-10-CM

## 2023-02-20 DIAGNOSIS — Z96.642 STATUS POST TOTAL HIP REPLACEMENT, LEFT: Primary | ICD-10-CM

## 2023-02-20 PROCEDURE — 99024 POSTOP FOLLOW-UP VISIT: CPT | Performed by: ORTHOPAEDIC SURGERY

## 2023-02-20 NOTE — LETTER
2/20/2023    Patient: Giancarlo Burch   YOB: 1945   Date of Visit: 2/20/2023     Ang Bruce MD  [address] [place] [id number]  Via Fax: [phone]    Dear Ang Bruce MD,      Thank you for referring Mr. Giancarlo Burch to [facility] for evaluation. My notes for this consultation are attached. If you have questions, please do not hesitate to call me. I look forward to following your patient along with you.       Sincerely,    Quentin Tobin MD

## 2023-02-20 NOTE — PROGRESS NOTES
Virgil Gill (: 1945) is a 65 y.o. male, patient, here for evaluation of the following chief complaint(s):  Surgical Follow-up (Right AYDE -- )       SUBJECTIVE/OBJECTIVE:  Virgil Gill presents today for routine follow-up 6 or 7 weeks out from left direct anterior hip replacement. Overall he is happy with progress regarding the hip. Significant improvement and overall mobility. Continues with outpatient therapy for another week or 2. A week after his surgery he was hospitalized for left lower extremity cellulitis. He has venous insufficiency. Wears a compression stocking most of the time. He has been referred to vascular medicine and his local area in [place]. Doppler was negative at the time he was hospitalized. PHYSICAL EXAM:  Vitals: Ht 6' (1.829 m)   Wt 253 lb (114.8 kg)   BMI 34.31 kg/m²   Body mass index is 34.31 kg/m². 65y.o. year old M, no distress. Ambulates with mild limp at start up, then gait normalizes. Left anterior hip incision is well-healed. Mild residual local edema. No erythema. Pain-free range of motion left hip. Has significant residual distal edema. Brawny changes from venous stasis. Erythema completely resolves with a few seconds of lower extremity elevation. No calf tenderness. IMAGING:  Radiographs: No images today. ASSESSMENT/PLAN:  1. Status post total hip replacement, left  2. Surgery follow-up    Satisfactory progress 2 months out from left total hip replacement. Continue physical therapy  Addition to home exercise program over the next few weeks. He will continue evaluation with vascular medicine. Return in 10 months for routine 1 year postop x-ray left hip, sooner if needed. No follow-ups on file. Review Of Systems     Patient denies any recent fever, chills, nausea, vomiting, chest pain, or shortness of breath.     No Known Allergies    Current Outpatient Medications   Medication Sig    apixaban (Eliquis) 2.5 mg tablet Take 1 Tablet by mouth two (2) times a day. Indications: deep vein thrombosis prevention    acetaminophen (TYLENOL) 500 mg tablet Take 1 Tablet by mouth every four (4) hours. torsemide (DEMADEX) 100 mg tablet Take 100 mg by mouth daily. cyanocobalamin (VITAMIN B12) 1,000 mcg/mL injection 1,000 mcg by IntraMUSCular route every month. HAS ALREADY HAD IN DEC. 2022    donepeziL (ARICEPT) 5 mg tablet Take 5 mg by mouth nightly. escitalopram oxalate (LEXAPRO) 5 mg tablet Take 5 mg by mouth daily. cetirizine (ZYRTEC) 10 mg tablet Take 10 mg by mouth nightly. cholecalciferol (VITAMIN D3) (1000 Units /25 mcg) tablet Take 2,000 Units by mouth daily. metoprolol succinate (TOPROL-XL) 50 mg XL tablet Take 50 mg by mouth daily. simvastatin (ZOCOR) 40 mg tablet Take 40 mg by mouth nightly. No current facility-administered medications for this visit.        Past Medical History:   Diagnosis Date    Abdominal aortic aneurysm (AAA) without rupture     Adverse effect of anesthesia 2020    confusion and memory loss after anesthesia    Arrhythmia     AFIB    Arthropathy     Basal cell carcinoma     LOW BACK, HEAD, NECK    Cerebrovascular disease     Chronic heart failure with preserved ejection fraction (HCC)     Dyslipidemia     Erectile dysfunction     Essential hypertension     GERD (gastroesophageal reflux disease)     H/O maze procedure     Heart failure ([address]     CHF    Hypertension     Mild intermittent asthma     Osteoarthritis of right hip     Psychiatric disorder     DEPRESSION    S/P AVR (aortic valve replacement) and aortoplasty     Sleep apnea     CPAP USE    Stroke ([address] 2011    DURING SURGERY FOR AORTIC VALVE        Past Surgical History:   Procedure Laterality Date    HX COLONOSCOPY      HX HEART VALVE SURGERY  2011    AORTIC VALVE    HX HEENT Bilateral     CATARACTS    HX HIP REPLACEMENT Right     4/2014 and 5/20/20    HX HIP REPLACEMENT Left 01/03/2023    HX OTHER SURGICAL  2022    Alzheimer's    HX TONSIL AND ADENOIDECTOMY      PA UNLISTED PROCEDURE CARDIAC SURGERY  2011    PA UNLISTED PROCEDURE VASCULAR SURGERY  2021    peripheral left leg       Family History   Problem Relation Age of Onset    Heart Disease Mother     Kidney Disease Father     Diabetes Sister     Diabetes Brother     Diabetes Brother     Heart Failure Other     Anesth Problems Neg Hx         Social History     Socioeconomic History    Marital status:      Spouse name: Not on file    Number of children: Not on file    Years of education: Not on file    Highest education level: Not on file   Occupational History    Not on file   Tobacco Use    Smoking status: Never    Smokeless tobacco: Never   Vaping Use    Vaping Use: Never used   Substance and Sexual Activity    Alcohol use: Never    Drug use: Never    Sexual activity: Not Currently     Partners: Female     Birth control/protection: None   Other Topics Concern    Not on file   Social History Narrative    Not on file     Social Determinants of Health     Financial Resource Strain: Not on file   Food Insecurity: Not on file   Transportation Needs: Not on file   Physical Activity: Not on file   Stress: Not on file   Social Connections: Not on file   Intimate Partner Violence: Not on file   Housing Stability: Not on file       No orders of the defined types were placed in this encounter. An electronic signature was used to authenticate this note.   -- Troy Connell MD

## 2023-05-16 RX ORDER — DONEPEZIL HYDROCHLORIDE 5 MG/1
5 TABLET, FILM COATED ORAL NIGHTLY
COMMUNITY
Start: 2022-06-15

## 2023-05-16 RX ORDER — METOPROLOL SUCCINATE 50 MG/1
50 TABLET, EXTENDED RELEASE ORAL DAILY
COMMUNITY
Start: 2020-06-01

## 2023-05-16 RX ORDER — CYANOCOBALAMIN 1000 UG/ML
1000 INJECTION, SOLUTION INTRAMUSCULAR; SUBCUTANEOUS
COMMUNITY
Start: 2022-06-15

## 2023-05-16 RX ORDER — TORSEMIDE 100 MG/1
100 TABLET ORAL DAILY
COMMUNITY
Start: 2022-10-06

## 2023-05-16 RX ORDER — SIMVASTATIN 40 MG
40 TABLET ORAL NIGHTLY
COMMUNITY
Start: 2020-03-04

## 2023-05-16 RX ORDER — CETIRIZINE HYDROCHLORIDE 10 MG/1
10 TABLET ORAL NIGHTLY
COMMUNITY
Start: 2020-06-04

## 2023-05-16 RX ORDER — ESCITALOPRAM OXALATE 5 MG/1
5 TABLET ORAL DAILY
COMMUNITY
Start: 2022-09-19

## 2023-05-16 RX ORDER — ACETAMINOPHEN 500 MG
500 TABLET ORAL EVERY 4 HOURS
COMMUNITY
Start: 2023-01-03

## 2025-06-16 NOTE — PROGRESS NOTES
Bedside shift change report given to [id number] Milly Valdez, RN (oncoming nurse) by Jc Lorenzo RN (offgoing nurse). Report included the following information SBAR, Kardex, Intake/Output, and MAR. 29.9

## (undated) DEVICE — ELECTRODE BLDE L4IN NONINSULATED EDGE

## (undated) DEVICE — SUTURE VCRL 1 L27IN ABSRB CT BRAID COAT UD J281H

## (undated) DEVICE — ADHESIVE SKIN CLSR 0.7ML TOP DERMBND ADV

## (undated) DEVICE — CONTAINER,SPECIMEN,3OZ,OR STRL: Brand: MEDLINE

## (undated) DEVICE — TOTAL JOINT - SMH: Brand: MEDLINE INDUSTRIES, INC.

## (undated) DEVICE — SYRINGE 20ML LL S/C 50

## (undated) DEVICE — GLOVE SURG SZ 65 L12IN FNGR THK79MIL GRN LTX FREE

## (undated) DEVICE — ELECTRODE PT RET AD L9FT HI MOIST COND ADH HYDRGEL CORDED

## (undated) DEVICE — GLOVE SURG SZ 65 L12IN FNGR THK94MIL STD WHT LTX FREE

## (undated) DEVICE — YANKAUER,OPEN TIP,W/O VENT,STERILE: Brand: MEDLINE INDUSTRIES, INC.

## (undated) DEVICE — SOLUTION IRRIG 3000ML 0.9% SOD CHL USP UROMATIC PLAS CONT

## (undated) DEVICE — ZIPPERED TOGA, 2X LARGE: Brand: FLYTE, SURGICOOL

## (undated) DEVICE — SOLUTION SURG PREP 26 CC PURPREP

## (undated) DEVICE — STERILE POLYISOPRENE POWDER-FREE SURGICAL GLOVES WITH EMOLLIENT COATING: Brand: PROTEXIS

## (undated) DEVICE — SPONGE LAPAROTOMY W18XL18IN WHITE STRUNG RADIOPAQUE STERILE

## (undated) DEVICE — 6619 2 PTNT ISO SYS INCISE AREA&LT;(&GT;&&LT;)&GT;P: Brand: STERI-DRAPE™ IOBAN™ 2

## (undated) DEVICE — ALCOHOL RUBBING ISO 16OZ 70%

## (undated) DEVICE — STERILE POLYISOPRENE POWDER-FREE SURGICAL GLOVES: Brand: PROTEXIS

## (undated) DEVICE — ZIPPERED TOGA, LARGE: Brand: FLYTE

## (undated) DEVICE — SUTURE MCRYL SZ 3-0 L27IN ABSRB UD L19MM PS-2 3/8 CIR PRIM Y427H

## (undated) DEVICE — SUTURE ABSORBABLE BRAIDED 2-0 CT-1 27 IN UD VICRYL J259H

## (undated) DEVICE — DRAPE,U/ SHT,SPLIT,PLAS,STERIL: Brand: MEDLINE

## (undated) DEVICE — PADDING CAST SPEC 6INX4YD COT --

## (undated) DEVICE — 4-PORT MANIFOLD: Brand: NEPTUNE 2

## (undated) DEVICE — C-ARM: Brand: UNBRANDED

## (undated) DEVICE — SUTURE STRATAFIX SPRL SZ 1 L14IN ABSRB VLT L48CM CTX 1/2 SXPD2B405

## (undated) DEVICE — SPONGE GZ W4XL4IN COT 12 PLY TYP VII WVN C FLD DSGN STERILE

## (undated) DEVICE — SCRUB DRY SURG EZ SCRUB BRUSH PREOPERATIVE GRN

## (undated) DEVICE — MARKER,SKIN,WI/RULER AND LABELS: Brand: MEDLINE

## (undated) DEVICE — DECANTER BAG 9": Brand: MEDLINE INDUSTRIES, INC.

## (undated) DEVICE — STRYKER PERFORMANCE SERIES SAGITTAL BLADE: Brand: STRYKER PERFORMANCE SERIES

## (undated) DEVICE — SUTURE VCRL SZ 2 L54IN ABSRB UD L65MM TP-1 1/2 CIR J880T

## (undated) DEVICE — HYPODERMIC SAFETY NEEDLE: Brand: MAGELLAN

## (undated) DEVICE — YANKAUER,FLEXIBLE HANDLE,REGLR CAPACITY: Brand: MEDLINE INDUSTRIES, INC.

## (undated) DEVICE — HANDPIECE SET WITH BONE CLEANING TIP AND SUCTION TUBE: Brand: INTERPULSE